# Patient Record
Sex: FEMALE | Race: WHITE | NOT HISPANIC OR LATINO | Employment: OTHER | URBAN - METROPOLITAN AREA
[De-identification: names, ages, dates, MRNs, and addresses within clinical notes are randomized per-mention and may not be internally consistent; named-entity substitution may affect disease eponyms.]

---

## 2017-02-10 ENCOUNTER — TELEPHONE (OUTPATIENT)
Dept: FAMILY MEDICINE | Facility: CLINIC | Age: 62
End: 2017-02-10

## 2017-02-21 ENCOUNTER — RADIANT APPOINTMENT (OUTPATIENT)
Dept: MAMMOGRAPHY | Facility: CLINIC | Age: 62
End: 2017-02-21
Attending: FAMILY MEDICINE
Payer: COMMERCIAL

## 2017-02-21 DIAGNOSIS — Z12.31 ENCOUNTER FOR MAMMOGRAM TO ESTABLISH BASELINE MAMMOGRAM: ICD-10-CM

## 2017-02-21 PROCEDURE — 77063 BREAST TOMOSYNTHESIS BI: CPT

## 2017-02-21 PROCEDURE — G0202 SCR MAMMO BI INCL CAD: HCPCS

## 2017-07-17 ENCOUNTER — OFFICE VISIT (OUTPATIENT)
Dept: FAMILY MEDICINE | Facility: CLINIC | Age: 62
End: 2017-07-17
Payer: COMMERCIAL

## 2017-07-17 VITALS
BODY MASS INDEX: 18.25 KG/M2 | HEIGHT: 63 IN | DIASTOLIC BLOOD PRESSURE: 66 MMHG | RESPIRATION RATE: 16 BRPM | WEIGHT: 103 LBS | HEART RATE: 47 BPM | TEMPERATURE: 97.6 F | SYSTOLIC BLOOD PRESSURE: 108 MMHG | OXYGEN SATURATION: 100 %

## 2017-07-17 DIAGNOSIS — Z00.00 ENCOUNTER FOR ROUTINE ADULT HEALTH EXAMINATION WITHOUT ABNORMAL FINDINGS: Primary | ICD-10-CM

## 2017-07-17 DIAGNOSIS — D64.9 ANEMIA, UNSPECIFIED TYPE: ICD-10-CM

## 2017-07-17 DIAGNOSIS — Z78.0 MENOPAUSE: ICD-10-CM

## 2017-07-17 DIAGNOSIS — M85.80 OSTEOPENIA, UNSPECIFIED LOCATION: ICD-10-CM

## 2017-07-17 DIAGNOSIS — D51.3 OTHER DIETARY VITAMIN B12 DEFICIENCY ANEMIA: ICD-10-CM

## 2017-07-17 LAB
BASOPHILS # BLD AUTO: 0.1 10E9/L (ref 0–0.2)
BASOPHILS NFR BLD AUTO: 1.9 %
DIFFERENTIAL METHOD BLD: ABNORMAL
EOSINOPHIL # BLD AUTO: 0.1 10E9/L (ref 0–0.7)
EOSINOPHIL NFR BLD AUTO: 3.2 %
ERYTHROCYTE [DISTWIDTH] IN BLOOD BY AUTOMATED COUNT: 13.3 % (ref 10–15)
FERRITIN SERPL-MCNC: 24 NG/ML (ref 8–252)
HCT VFR BLD AUTO: 43 % (ref 35–47)
HGB BLD-MCNC: 14.2 G/DL (ref 11.7–15.7)
LYMPHOCYTES # BLD AUTO: 1.4 10E9/L (ref 0.8–5.3)
LYMPHOCYTES NFR BLD AUTO: 36 %
MCH RBC QN AUTO: 30.3 PG (ref 26.5–33)
MCHC RBC AUTO-ENTMCNC: 33 G/DL (ref 31.5–36.5)
MCV RBC AUTO: 92 FL (ref 78–100)
MONOCYTES # BLD AUTO: 0.4 10E9/L (ref 0–1.3)
MONOCYTES NFR BLD AUTO: 9.3 %
NEUTROPHILS # BLD AUTO: 1.9 10E9/L (ref 1.6–8.3)
NEUTROPHILS NFR BLD AUTO: 49.6 %
PLATELET # BLD AUTO: 209 10E9/L (ref 150–450)
RBC # BLD AUTO: 4.69 10E12/L (ref 3.8–5.2)
VIT B12 SERPL-MCNC: 487 PG/ML (ref 193–986)
WBC # BLD AUTO: 3.8 10E9/L (ref 4–11)

## 2017-07-17 PROCEDURE — 99396 PREV VISIT EST AGE 40-64: CPT | Performed by: FAMILY MEDICINE

## 2017-07-17 PROCEDURE — 82607 VITAMIN B-12: CPT | Performed by: FAMILY MEDICINE

## 2017-07-17 PROCEDURE — 36415 COLL VENOUS BLD VENIPUNCTURE: CPT | Performed by: FAMILY MEDICINE

## 2017-07-17 PROCEDURE — 85025 COMPLETE CBC W/AUTO DIFF WBC: CPT | Performed by: FAMILY MEDICINE

## 2017-07-17 PROCEDURE — 82728 ASSAY OF FERRITIN: CPT | Performed by: FAMILY MEDICINE

## 2017-07-17 ASSESSMENT — PAIN SCALES - GENERAL: PAINLEVEL: NO PAIN (0)

## 2017-07-17 NOTE — PROGRESS NOTES
SUBJECTIVE:   CC: Rosa Craven is an 61 year old woman who presents for preventive health visit.     Healthy Habits:    Do you get at least three servings of calcium containing foods daily (dairy, green leafy vegetables, etc.)? yes    Amount of exercise or daily activities, outside of work: 7 day(s) per week    Problems taking medications regularly No    Medication side effects: No    Have you had an eye exam in the past two years? yes    Do you see a dentist twice per year? yes    Do you have sleep apnea, excessive snoring or daytime drowsiness?no    Health Maintenance    - Pap Smear in 2015, normal, complete in 2018.    -Mammogram in February 2017, good.    -Colonoscopy in 2011, 1 polyp removed, benign    -Check Cholesterol levels and glucose levels. Patient is fasting today.    -Screening Hep-C completed previously.   -Tetanus vaccine completed previously.  -Shingles vaccine completed previously.    Nutrition  - Does not like to drink milk. She tries to get calcium from multivitamin.     Anemia  - Had been told previously that she had anemia, however it is not an issue right now.    Today's PHQ-2 Score:   PHQ-2 ( 1999 Pfizer) 7/17/2017 11/12/2015   Q1: Little interest or pleasure in doing things 0 0   Q2: Feeling down, depressed or hopeless 0 0   PHQ-2 Score 0 0   Q1: Little interest or pleasure in doing things - -   Q2: Feeling down, depressed or hopeless - -   PHQ-2 Score - -       Abuse: Current or Past(Physical, Sexual or Emotional)- No  Do you feel safe in your environment - Yes    Social History   Substance Use Topics     Smoking status: Never Smoker     Smokeless tobacco: Never Used     Alcohol use No     The patient does not drink >3 drinks per day nor >7 drinks per week.    Reviewed orders with patient.  Reviewed health maintenance and updated orders accordingly - Yes  Patient Active Problem List   Diagnosis     CARDIOVASCULAR SCREENING; LDL GOAL LESS THAN 160     Anemia     B12 deficiency anemia  "    Advanced directives, counseling/discussion     Osteopenia     Past Surgical History:   Procedure Laterality Date     COLONOSCOPY       ENDOSCOPY UPPER, COLONOSCOPY, COMBINED  12/2/2011    Procedure:COMBINED ENDOSCOPY UPPER, COLONOSCOPY; Colonoscopy with Snare polypectomy, Esophagogastroduodenoscopy with duodenal and gastric biopsy; Surgeon:MONTY WOOD; Location: OR      CAPSULE ENDOSCOPY  12/15/2011    Procedure:CAPSULE/PILL CAM ENDOSCOPY; Surgeon:NEEMA INMAN; Location: GI     wisdom teeth  as teen       Social History   Substance Use Topics     Smoking status: Never Smoker     Smokeless tobacco: Never Used     Alcohol use No     Family History   Problem Relation Age of Onset     CANCER Mother 64     cervical CA     C.A.D. Father 46     MI (perhaps it was a PE as thought came from DVT)- smoked, overweight           Patient over age 50, mutual decision to screen reflected in health maintenance.    Pertinent mammograms are reviewed under the imaging tab.  History of abnormal Pap smear: NO - age 30- 65 PAP every 3 years recommended    Reviewed and updated as needed this visit by clinical staff  Tobacco  Allergies  Meds  Med Hx  Surg Hx  Fam Hx  Soc Hx        Reviewed and updated as needed this visit by Provider            ROS:   ROS: 10 point ROS neg other than the symptoms noted above in the HPI.    This document serves as a record of the services and decisions personally performed and made by Emma Guerra MD. It was created on her behalf by Isabel Mariano, a trained medical scribe. The creation of this document is based on the provider's statements to the medical scribe.  Isabel Mariano 2:08 PM July 17, 2017      OBJECTIVE:   /66 (BP Location: Right arm, Patient Position: Chair, Cuff Size: Adult Regular)  Pulse (!) 47  Temp 97.6  F (36.4  C) (Oral)  Resp 16  Ht 1.588 m (5' 2.5\")  Wt 46.7 kg (103 lb)  SpO2 100%  Breastfeeding? No  BMI 18.54 kg/m2  EXAM:  GENERAL " APPEARANCE: healthy, alert and no distress  EYES: Eyes grossly normal to inspection, PERRL and conjunctivae and sclerae normal  HENT: ear canals and TM's normal, nose and mouth without ulcers or lesions, oropharynx clear and oral mucous membranes moist  NECK: no adenopathy, no asymmetry, masses, or scars and thyroid normal to palpation  RESP: lungs clear to auscultation - no rales, rhonchi or wheezes  BREAST: normal without masses, tenderness or nipple discharge and no palpable axillary masses or adenopathy  CV: regular rate and rhythm, normal S1 S2, no S3 or S4, no murmur, click or rub, no peripheral edema and peripheral pulses strong  ABDOMEN: soft, nontender, no hepatosplenomegaly, no masses and bowel sounds normal   (female): normal female external genitalia, normal urethral meatus, vaginal mucosal atrophy noted, normal cervix, adnexae, and uterus without masses or abnormal discharge  MS: no musculoskeletal defects are noted and gait is age appropriate without ataxia  SKIN: no suspicious lesions or rashes  NEURO: Normal strength and tone, sensory exam grossly normal, mentation intact and speech normal  PSYCH: mentation appears normal and affect normal/bright    ASSESSMENT/PLAN:   1. Encounter for routine adult health examination without abnormal findings    2. Osteopenia, unspecified location  5. Menopause  Discussed adequate calcium intake, vit D, exercise  - DX Hip/Pelvis/Spine; Future    3. Other dietary vitamin B12 deficiency anemia  - CBC with platelets differential  - Vitamin B12    4. Anemia, unspecified type  Last testing had been stable. Patient would like to confirm staying stable.   - CBC with platelets differential  - Ferritin  - Vitamin B12          COUNSELING:   Reviewed preventive health counseling, as reflected in patient instructions       Regular exercise       Healthy diet/nutrition       Vision screening       Hearing screening       Sunscreen         reports that she has never smoked. She  "has never used smokeless tobacco.    Estimated body mass index is 18.54 kg/(m^2) as calculated from the following:    Height as of this encounter: 1.588 m (5' 2.5\").    Weight as of this encounter: 46.7 kg (103 lb).   Weight management plan noted, stable and monitoring and .    Counseling Resources:  ATP IV Guidelines  Pooled Cohorts Equation Calculator  Breast Cancer Risk Calculator  FRAX Risk Assessment  ICSI Preventive Guidelines  Dietary Guidelines for Americans, 2010  USDA's MyPlate  ASA Prophylaxis  Lung CA Screening      The information in this document, created by the medical scribe for me, accurately reflects the services I personally performed and the decisions made by me. I have reviewed and approved this document for accuracy prior to leaving the patient care area.  July 17, 2017 2:09 PM    Emma Guerra MD  [facility]  "

## 2017-07-17 NOTE — PATIENT INSTRUCTIONS
Preventive Health Recommendations  Female Ages 50 - 64    Please call [facility] (formerly called [facility]) at [phone] to schedule Bone scan.    Yearly exam: See your health care provider every year in order to  o Review health changes.   o Discuss preventive care.    o Review your medicines if your doctor has prescribed any.      Get a Pap test every three years (unless you have an abnormal result and your provider advises testing more often).    If you get Pap tests with HPV test, you only need to test every 5 years, unless you have an abnormal result.     You do not need a Pap test if your uterus was removed (hysterectomy) and you have not had cancer.    You should be tested each year for STDs (sexually transmitted diseases) if you're at risk.     Have a mammogram every 1 to 2 years.    Have a colonoscopy at age 50, or have a yearly FIT test (stool test). These exams screen for colon cancer.      Have a cholesterol test every 5 years, or more often if advised.    Have a diabetes test (fasting glucose) every three years. If you are at risk for diabetes, you should have this test more often.     If you are at risk for osteoporosis (brittle bone disease), think about having a bone density scan (DEXA).    Shots: Get a flu shot each year. Get a tetanus shot every 10 years.    Nutrition:     Eat at least 5 servings of fruits and vegetables each day.    Eat whole-grain bread, whole-wheat pasta and brown rice instead of white grains and rice.    Talk to your provider about 3 servings of Calcium Magnesium, and Vitamin D 1000mg.     Lifestyle    Exercise at least 150 minutes a week (30 minutes a day, 5 days a week). Recommend to include weight bearing exercises. This will help you control your weight and prevent disease.    Limit alcohol to one drink per day.    No smoking.     Wear sunscreen to prevent skin cancer.     See your dentist every six months for an exam  and cleaning.    See your eye doctor every 1 to 2 years.

## 2017-07-17 NOTE — MR AVS SNAPSHOT
After Visit Summary   7/17/2017    Rosa Craven    MRN: [id number]           Patient Information     Date Of Birth          1955        Visit Information        Provider Department      7/17/2017 9:20 AM Emma Guerra MD [facility]        Today's Diagnoses     Encounter for routine adult health examination without abnormal findings    -  1    Osteopenia, unspecified location        Other dietary vitamin B12 deficiency anemia        Anemia, unspecified type        Menopause          Care Instructions      Preventive Health Recommendations  Female Ages 50 - 64    Yearly exam: See your health care provider every year in order to  o Review health changes.   o Discuss preventive care.    o Review your medicines if your doctor has prescribed any.      Get a Pap test every three years (unless you have an abnormal result and your provider advises testing more often).    If you get Pap tests with HPV test, you only need to test every 5 years, unless you have an abnormal result.     You do not need a Pap test if your uterus was removed (hysterectomy) and you have not had cancer.    You should be tested each year for STDs (sexually transmitted diseases) if you're at risk.     Have a mammogram every 1 to 2 years.    Have a colonoscopy at age 50, or have a yearly FIT test (stool test). These exams screen for colon cancer.      Have a cholesterol test every 5 years, or more often if advised.    Have a diabetes test (fasting glucose) every three years. If you are at risk for diabetes, you should have this test more often.     If you are at risk for osteoporosis (brittle bone disease), think about having a bone density scan (DEXA).    Shots: Get a flu shot each year. Get a tetanus shot every 10 years.    Nutrition:     Eat at least 5 servings of fruits and vegetables each day.    Eat whole-grain bread, whole-wheat pasta and brown rice instead of white grains and rice.    Talk to  your provider about 3 servings of Calcium Magnesium, and Vitamin D 1000mg.     Lifestyle    Exercise at least 150 minutes a week (30 minutes a day, 5 days a week). Recommend to include weight bearing exercises. This will help you control your weight and prevent disease.    Limit alcohol to one drink per day.    No smoking.     Wear sunscreen to prevent skin cancer.     See your dentist every six months for an exam and cleaning.    See your eye doctor every 1 to 2 years.      Please call [facility] (formerly called [facility]) at [phone] to schedule Bone scan.            Follow-ups after your visit        Future tests that were ordered for you today     Open Future Orders        Priority Expected Expires Ordered    DX Hip/Pelvis/Spine Routine  7/17/2018 7/17/2017            Who to contact     If you have questions or need follow up information about today's clinic visit or your schedule please contact [facility] directly at [phone].  Normal or non-critical lab and imaging results will be communicated to you by Noster Mobilehart, letter or phone within 4 business days after the clinic has received the results. If you do not hear from us within 7 days, please contact the clinic through Spayee or phone. If you have a critical or abnormal lab result, we will notify you by phone as soon as possible.  Submit refill requests through Spayee or call your pharmacy and they will forward the refill request to us. Please allow 3 business days for your refill to be completed.          Additional Information About Your Visit        Noster MobileharVisiprise Information     Spayee gives you secure access to your electronic health record. If you see a primary care provider, you can also send messages to your care team and make appointments. If you have questions, please call your primary care clinic.  If you do not have a primary care provider, please call [phone] and they will  "assist you.        Care EveryWhere ID     This is your Care EveryWhere ID. This could be used by other organizations to access your [place] medical records  [id number]        Your Vitals Were     Pulse Temperature Respirations Height Pulse Oximetry Breastfeeding?    47 97.6  F (36.4  C) (Oral) 16 1.588 m (5' 2.5\") 100% No    BMI (Body Mass Index)                   18.54 kg/m2            Blood Pressure from Last 3 Encounters:   07/17/17 108/66   04/13/16 130/82   11/12/15 110/70    Weight from Last 3 Encounters:   07/17/17 46.7 kg (103 lb)   04/13/16 48.1 kg (106 lb)   11/12/15 47.4 kg (104 lb 9.6 oz)              We Performed the Following     CBC with platelets differential     Ferritin     Vitamin B12        Primary Care Provider Office Phone # Fax #    Emma Guerra [phone]       [facility] [address] [place] [address] [address]  [facility] [address]        Equal Access to Services     YULIANA MURRAY : Hadii aad ku hadasho Soomaali, waaxda luqadaha, qaybta kaalmada adeegyada, waxay idiin haygino paulino . So [facility] [phone].    ATENCIÓN: Si habla español, tiene a kemp disposición servicios gratuitos de asistencia lingüística. Llame al [phone].    We comply with applicable federal civil rights laws and [place] laws. We do not discriminate on the basis of race, color, national origin, age, disability sex, sexual orientation or gender identity.            Thank you!     Thank you for choosing [facility]  for your care. Our goal is always to provide you with excellent care. Hearing back from our patients is one way we can continue to improve our services. Please take a few minutes to complete the written survey that you may receive in the mail after your visit with us. Thank you!             Your Updated Medication List - Protect others around you: Learn how to safely use, store and throw away your medicines at www.disposemymeds.org.          This list is " accurate as of: 7/17/17  9:59 AM.  Always use your most recent med list.                   Brand Name Dispense Instructions for use Diagnosis    MULTIVITAMIN PO      Take 1 tablet by mouth daily.

## 2017-07-17 NOTE — NURSING NOTE
"Chief Complaint   Patient presents with     Physical       Initial /66 (BP Location: Right arm, Patient Position: Chair, Cuff Size: Adult Regular)  Pulse (!) 47  Temp 97.6  F (36.4  C) (Oral)  Resp 16  Ht 1.588 m (5' 2.5\")  Wt 46.7 kg (103 lb)  SpO2 100%  Breastfeeding? No  BMI 18.54 kg/m2 Estimated body mass index is 18.54 kg/(m^2) as calculated from the following:    Height as of this encounter: 1.588 m (5' 2.5\").    Weight as of this encounter: 46.7 kg (103 lb).  Medication Reconciliation: complete     Monique Carlson MA       "

## 2017-12-20 ENCOUNTER — RADIANT APPOINTMENT (OUTPATIENT)
Dept: BONE DENSITY | Facility: CLINIC | Age: 62
End: 2017-12-20
Attending: FAMILY MEDICINE
Payer: COMMERCIAL

## 2017-12-20 PROCEDURE — 77080 DXA BONE DENSITY AXIAL: CPT | Performed by: RADIOLOGY

## 2018-02-07 ENCOUNTER — ALLIED HEALTH/NURSE VISIT (OUTPATIENT)
Dept: NURSING | Facility: CLINIC | Age: 63
End: 2018-02-07
Payer: COMMERCIAL

## 2018-02-07 DIAGNOSIS — Z23 NEED FOR PROPHYLACTIC VACCINATION AND INOCULATION AGAINST INFLUENZA: Primary | ICD-10-CM

## 2018-02-07 PROCEDURE — 90471 IMMUNIZATION ADMIN: CPT

## 2018-02-07 PROCEDURE — 99207 ZZC NO CHARGE NURSE ONLY: CPT

## 2018-02-07 PROCEDURE — 90686 IIV4 VACC NO PRSV 0.5 ML IM: CPT

## 2018-02-07 NOTE — MR AVS SNAPSHOT
After Visit Summary   2/7/2018    Rosa Craven    MRN: [id number]           Patient Information     Date Of Birth          1955        Visit Information        Provider Department      2/7/2018 1:20 PM BA [facility]LL[facility]        Today's Diagnoses     Need for prophylactic vaccination and inoculation against influenza    -  1       Follow-ups after your visit        Who to contact     If you have questions or need follow up information about today's clinic visit or your schedule please contact [facility] directly at [phone].  Normal or non-critical lab and imaging results will be communicated to you by Athlete Builderhart, letter or phone within 4 business days after the clinic has received the results. If you do not hear from us within 7 days, please contact the clinic through EnvironmentIQt or phone. If you have a critical or abnormal lab result, we will notify you by phone as soon as possible.  Submit refill requests through CrowdFeed or call your pharmacy and they will forward the refill request to us. Please allow 3 business days for your refill to be completed.          Additional Information About Your Visit        MyChart Information     CrowdFeed gives you secure access to your electronic health record. If you see a primary care provider, you can also send messages to your care team and make appointments. If you have questions, please call your primary care clinic.  If you do not have a primary care provider, please call [phone] and they will assist you.        Care EveryWhere ID     This is your Care EveryWhere ID. This could be used by other organizations to access your [place] medical records  [id number]         Blood Pressure from Last 3 Encounters:   07/17/17 108/66   04/13/16 130/82   11/12/15 110/70    Weight from Last 3 Encounters:   07/17/17 46.7 kg (103 lb)   04/13/16 48.1 kg (106 lb)   11/12/15 47.4 kg (104 lb 9.6 oz)              We Performed the  Following     FLU VAC, SPLIT VIRUS IM > 3 YO (QUADRIVALENT) [44435]     Vaccine Administration, Initial [43825]        Primary Care Provider Office Phone # Fax #    Emma Guerra [phone] [address]  [facility] [address]        Equal Access to Services     [facility] Hospital[facility] : Hadii aad ku hadasho Soomaali, waaxda luqadaha, qaybta kaalmada adeegyada, waxay idiin hayaan adeeg kharash laAidenaan . So [facility] [phone].    ATENCIÓN: Si habla español, tiene a kemp disposición servicios gratuitos de asistencia lingüística. Llame al [phone].    We comply with applicable federal civil rights laws and [place] laws. We do not discriminate on the basis of race, color, national origin, age, disability, sex, sexual orientation, or gender identity.            Thank you!     Thank you for choosing [facility]  for your care. Our goal is always to provide you with excellent care. Hearing back from our patients is one way we can continue to improve our services. Please take a few minutes to complete the written survey that you may receive in the mail after your visit with us. Thank you!             Your Updated Medication List - Protect others around you: Learn how to safely use, store and throw away your medicines at www.disposemymeds.org.          This list is accurate as of 2/7/18  1:25 PM.  Always use your most recent med list.                   Brand Name Dispense Instructions for use Diagnosis    MULTIVITAMIN PO      Take 1 tablet by mouth daily.

## 2018-02-07 NOTE — PROGRESS NOTES

## 2018-05-21 ENCOUNTER — RADIANT APPOINTMENT (OUTPATIENT)
Dept: MAMMOGRAPHY | Facility: CLINIC | Age: 63
End: 2018-05-21
Attending: FAMILY MEDICINE
Payer: COMMERCIAL

## 2018-05-21 DIAGNOSIS — Z12.31 VISIT FOR SCREENING MAMMOGRAM: ICD-10-CM

## 2018-05-21 PROCEDURE — 77063 BREAST TOMOSYNTHESIS BI: CPT | Performed by: RADIOLOGY

## 2018-05-21 PROCEDURE — 77067 SCR MAMMO BI INCL CAD: CPT | Performed by: RADIOLOGY

## 2019-02-15 ENCOUNTER — HEALTH MAINTENANCE LETTER (OUTPATIENT)
Age: 64
End: 2019-02-15

## 2019-03-18 ENCOUNTER — OFFICE VISIT (OUTPATIENT)
Dept: FAMILY MEDICINE | Facility: CLINIC | Age: 64
End: 2019-03-18
Payer: COMMERCIAL

## 2019-03-18 VITALS
OXYGEN SATURATION: 99 % | HEART RATE: 54 BPM | RESPIRATION RATE: 16 BRPM | SYSTOLIC BLOOD PRESSURE: 108 MMHG | DIASTOLIC BLOOD PRESSURE: 75 MMHG | BODY MASS INDEX: 18.07 KG/M2 | TEMPERATURE: 98.2 F | WEIGHT: 102 LBS | HEIGHT: 63 IN

## 2019-03-18 DIAGNOSIS — Z12.83 SCREENING FOR SKIN CANCER: ICD-10-CM

## 2019-03-18 DIAGNOSIS — D51.9 ANEMIA DUE TO VITAMIN B12 DEFICIENCY, UNSPECIFIED B12 DEFICIENCY TYPE: ICD-10-CM

## 2019-03-18 DIAGNOSIS — Z11.4 SCREENING FOR HIV (HUMAN IMMUNODEFICIENCY VIRUS): ICD-10-CM

## 2019-03-18 DIAGNOSIS — Z23 NEED FOR SHINGLES VACCINE: ICD-10-CM

## 2019-03-18 DIAGNOSIS — Z23 NEED FOR PROPHYLACTIC VACCINATION AND INOCULATION AGAINST INFLUENZA: ICD-10-CM

## 2019-03-18 DIAGNOSIS — D64.9 ANEMIA, UNSPECIFIED TYPE: ICD-10-CM

## 2019-03-18 DIAGNOSIS — Z12.4 SCREENING FOR MALIGNANT NEOPLASM OF CERVIX: ICD-10-CM

## 2019-03-18 DIAGNOSIS — M81.0 OSTEOPOROSIS, UNSPECIFIED OSTEOPOROSIS TYPE, UNSPECIFIED PATHOLOGICAL FRACTURE PRESENCE: ICD-10-CM

## 2019-03-18 DIAGNOSIS — Z00.00 ENCOUNTER FOR ROUTINE ADULT HEALTH EXAMINATION WITHOUT ABNORMAL FINDINGS: Primary | ICD-10-CM

## 2019-03-18 LAB
BASOPHILS # BLD AUTO: 0 10E9/L (ref 0–0.2)
BASOPHILS NFR BLD AUTO: 0.7 %
CHOLEST SERPL-MCNC: 253 MG/DL
DIFFERENTIAL METHOD BLD: NORMAL
EOSINOPHIL # BLD AUTO: 0.1 10E9/L (ref 0–0.7)
EOSINOPHIL NFR BLD AUTO: 2.4 %
ERYTHROCYTE [DISTWIDTH] IN BLOOD BY AUTOMATED COUNT: 13.1 % (ref 10–15)
FERRITIN SERPL-MCNC: 36 NG/ML (ref 8–252)
GLUCOSE SERPL-MCNC: 99 MG/DL (ref 70–99)
HCT VFR BLD AUTO: 42.3 % (ref 35–47)
HDLC SERPL-MCNC: 92 MG/DL
HGB BLD-MCNC: 13.8 G/DL (ref 11.7–15.7)
LDLC SERPL CALC-MCNC: 138 MG/DL
LYMPHOCYTES # BLD AUTO: 1.5 10E9/L (ref 0.8–5.3)
LYMPHOCYTES NFR BLD AUTO: 36.1 %
MCH RBC QN AUTO: 30.5 PG (ref 26.5–33)
MCHC RBC AUTO-ENTMCNC: 32.6 G/DL (ref 31.5–36.5)
MCV RBC AUTO: 93 FL (ref 78–100)
MONOCYTES # BLD AUTO: 0.4 10E9/L (ref 0–1.3)
MONOCYTES NFR BLD AUTO: 9 %
NEUTROPHILS # BLD AUTO: 2.2 10E9/L (ref 1.6–8.3)
NEUTROPHILS NFR BLD AUTO: 51.8 %
NONHDLC SERPL-MCNC: 161 MG/DL
PLATELET # BLD AUTO: 203 10E9/L (ref 150–450)
RBC # BLD AUTO: 4.53 10E12/L (ref 3.8–5.2)
TRIGL SERPL-MCNC: 115 MG/DL
VIT B12 SERPL-MCNC: 496 PG/ML (ref 193–986)
WBC # BLD AUTO: 4.2 10E9/L (ref 4–11)

## 2019-03-18 PROCEDURE — 99396 PREV VISIT EST AGE 40-64: CPT | Mod: 25 | Performed by: FAMILY MEDICINE

## 2019-03-18 PROCEDURE — G0123 SCREEN CERV/VAG THIN LAYER: HCPCS | Performed by: FAMILY MEDICINE

## 2019-03-18 PROCEDURE — 82947 ASSAY GLUCOSE BLOOD QUANT: CPT | Performed by: FAMILY MEDICINE

## 2019-03-18 PROCEDURE — 90472 IMMUNIZATION ADMIN EACH ADD: CPT | Performed by: FAMILY MEDICINE

## 2019-03-18 PROCEDURE — 87624 HPV HI-RISK TYP POOLED RSLT: CPT | Performed by: FAMILY MEDICINE

## 2019-03-18 PROCEDURE — 85025 COMPLETE CBC W/AUTO DIFF WBC: CPT | Performed by: FAMILY MEDICINE

## 2019-03-18 PROCEDURE — 82607 VITAMIN B-12: CPT | Performed by: FAMILY MEDICINE

## 2019-03-18 PROCEDURE — 82728 ASSAY OF FERRITIN: CPT | Performed by: FAMILY MEDICINE

## 2019-03-18 PROCEDURE — 90471 IMMUNIZATION ADMIN: CPT | Performed by: FAMILY MEDICINE

## 2019-03-18 PROCEDURE — 80061 LIPID PANEL: CPT | Performed by: FAMILY MEDICINE

## 2019-03-18 PROCEDURE — 87389 HIV-1 AG W/HIV-1&-2 AB AG IA: CPT | Performed by: FAMILY MEDICINE

## 2019-03-18 PROCEDURE — 90686 IIV4 VACC NO PRSV 0.5 ML IM: CPT | Performed by: FAMILY MEDICINE

## 2019-03-18 PROCEDURE — 99212 OFFICE O/P EST SF 10 MIN: CPT | Mod: 25 | Performed by: FAMILY MEDICINE

## 2019-03-18 PROCEDURE — 90750 HZV VACC RECOMBINANT IM: CPT | Performed by: FAMILY MEDICINE

## 2019-03-18 PROCEDURE — 36415 COLL VENOUS BLD VENIPUNCTURE: CPT | Performed by: FAMILY MEDICINE

## 2019-03-18 RX ORDER — ALENDRONATE SODIUM 70 MG/1
70 TABLET ORAL
Qty: 12 TABLET | Refills: 3 | Status: SHIPPED | OUTPATIENT
Start: 2019-03-18 | End: 2020-02-14

## 2019-03-18 ASSESSMENT — MIFFLIN-ST. JEOR: SCORE: 988.86

## 2019-03-18 ASSESSMENT — PAIN SCALES - GENERAL: PAINLEVEL: NO PAIN (0)

## 2019-03-18 NOTE — PROGRESS NOTES
SUBJECTIVE:   CC: Rosa Craven is an 63 year old woman who presents for preventive health visit.     Healthy Habits:    Do you get at least three servings of calcium containing foods daily (dairy, green leafy vegetables, etc.)? yes    Amount of exercise or daily activities, outside of work: 7 day(s) per week    Problems taking medications regularly No    Medication side effects: No    Have you had an eye exam in the past two years? yes    Do you see a dentist twice per year? yes    Do you have sleep apnea, excessive snoring or daytime drowsiness?no    Mood:  In the last year patient retired and moved to a cabin up [place], but keeps a townhouse in this area so still spends time here. She is still adjusting to custodial, but the last year was so busy with moving and building she does not feel she has really been able to enjoy being retired. She is not used to spending so much time with her  which has been an adjustment but they are working on their relationship and her overall mood has been good.     Additional:  -Taking a multi-vitamin regularly. Patient was considering starting a calcium supplement but did not do this because she wasn't sure what to start. She eats two servings of dairy daily   -Attributes her minor weight loss to stress from the move and missing meals due to being so busy  -Occasionally gets heartburn in the morning when eating toast and drinking coffee    Wt Readings from Last 4 Encounters:   03/18/19 46.3 kg (102 lb)   07/17/17 46.7 kg (103 lb)   04/13/16 48.1 kg (106 lb)   11/12/15 47.4 kg (104 lb 9.6 oz)       Today's PHQ-2 Score:   PHQ-2 ( 1999 Pfizer) 3/18/2019 7/17/2017   Q1: Little interest or pleasure in doing things 0 0   Q2: Feeling down, depressed or hopeless 0 0   PHQ-2 Score 0 0   Q1: Little interest or pleasure in doing things - -   Q2: Feeling down, depressed or hopeless - -   PHQ-2 Score - -       Abuse: Current or Past(Physical, Sexual or Emotional)- No  Do you feel  safe in your environment? Yes    Social History     Tobacco Use     Smoking status: Never Smoker     Smokeless tobacco: Never Used   Substance Use Topics     Alcohol use: No     If you drink alcohol do you typically have >3 drinks per day or >7 drinks per week? No                     Reviewed orders with patient.  Reviewed health maintenance and updated orders accordingly - Yes  Patient Active Problem List   Diagnosis     CARDIOVASCULAR SCREENING; LDL GOAL LESS THAN 160     Anemia     B12 deficiency anemia     Advanced directives, counseling/discussion     Osteopenia     Past Surgical History:   Procedure Laterality Date     COLONOSCOPY       ENDOSCOPY UPPER, COLONOSCOPY, COMBINED  12/2/2011    Procedure:COMBINED ENDOSCOPY UPPER, COLONOSCOPY; Colonoscopy with Snare polypectomy, Esophagogastroduodenoscopy with duodenal and gastric biopsy; Surgeon:MONTY WOOD; Location: OR      CAPSULE ENDOSCOPY  12/15/2011    Procedure:CAPSULE/PILL CAM ENDOSCOPY; Surgeon:NEEMA INMAN; Location: GI     wisdom teeth  as teen       Social History     Tobacco Use     Smoking status: Never Smoker     Smokeless tobacco: Never Used   Substance Use Topics     Alcohol use: No     Family History   Problem Relation Age of Onset     Cancer Mother 64        cervical CA     C.A.D. Father 46        MI (perhaps it was a PE as thought came from DVT)- smoked, overweight           Mammogram Screening: Patient over age 50, mutual decision to screen reflected in health maintenance.    Pertinent mammograms are reviewed under the imaging tab.  History of abnormal Pap smear: NO - age 30-65 PAP every 5 years with negative HPV co-testing recommended  PAP / HPV 11/12/2015 6/18/2013 2/16/2011   PAP NIL NIL NIL     Reviewed and updated as needed this visit by clinical staff  Tobacco  Allergies  Meds  Med Hx  Surg Hx  Fam Hx  Soc Hx        Reviewed and updated as needed this visit by Provider            ROS:   ROS: 10 point ROS neg other  "than the symptoms noted above in the HPI.    This document serves as a record of the services and decisions personally performed by LOIS ALLEN. It was created on his/her behalf by Jaycee Arroyo, a trained medical scribe. The creation of this document is based on the provider's statements to the medical scribe. Jaycee Arroyo, March 18, 2019 10:18 AM  OBJECTIVE:   /75 (BP Location: Right arm, Patient Position: Chair, Cuff Size: Adult Regular)   Pulse 54   Temp 98.2  F (36.8  C) (Oral)   Resp 16   Ht 1.588 m (5' 2.5\")   Wt 46.3 kg (102 lb)   LMP  (Exact Date)   SpO2 99%   Breastfeeding? No   BMI 18.36 kg/m    EXAM:  GENERAL APPEARANCE: healthy, alert and no distress  EYES: Eyes grossly normal to inspection, PERRL and conjunctivae and sclerae normal  HENT: ear canals and TM's normal, nose and mouth without ulcers or lesions, oropharynx clear and oral mucous membranes moist  NECK: no adenopathy, no asymmetry, masses, or scars and thyroid normal to palpation  RESP: lungs clear to auscultation - no rales, rhonchi or wheezes  BREAST: normal without masses, tenderness or nipple discharge and no palpable axillary masses or adenopathy  CV: regular rate and rhythm, normal S1 S2, no S3 or S4, no murmur, click or rub, no peripheral edema and peripheral pulses strong  ABDOMEN: soft, nontender, no hepatosplenomegaly, no masses and bowel sounds normal   (female): normal female external genitalia, normal urethral meatus, vaginal mucosal atrophy noted, normal cervix, adnexae, and uterus without masses or abnormal discharge  MS: no musculoskeletal defects are noted and gait is age appropriate without ataxia  SKIN: scattered nevi across back. no suspicious lesions or rashes  NEURO: Normal strength and tone, sensory exam grossly normal, mentation intact and speech normal  PSYCH: mentation appears normal and affect normal/bright    12/19/17 Dx Hip/Pelvis/Spine:   HISTORY: ; Osteopenia, unspecified " location; Menopause     COMPARISON:   12/10/2015     Age: 62  years.  Height: 62 inches  Weight: 103 pounds  Sex: Female  Ethnicity: White     Image quality: Adequate     Lumbar spine T-score in region of L1-L4 = -2.3   L1-4 percent change: -2.7%      HIPS:  Mean total hip T-score: -1.9  Mean total hip percent change: -2.7%      Left femoral neck T-score = -2.5  Right femoral neck T-score= -1.8      COMPARISON TO PRIOR:  Percent change in the spine and hips is NOT significant (or considered  invalid) accounting for the precision errors for this facility.      FRAX:  10 year probability of major osteoporotic fracture: 10.0%  10 year probability of hip fracture: 2.1%  The 10 year probability of fracture may be lower than reported if the  patient has received treatment. FRAX data should be disregarded in  patient's taking bisphosphonates.     World Health Organization definition of osteoporosis and osteopenia  for  women:   Normal: T-score at or above -1.0  Low Bone Mass (Osteopenia): T-score between -1.0 and -2.5.   Osteoporosis: T-score at or below -2.5   T-scores are reported for postmenopausal women and men over 50 years  of age.                                                                      IMPRESSION:  Osteoporosis     GLENN DUDLEY MD  ASSESSMENT/PLAN:   1. Encounter for routine adult health examination without abnormal findings  - GLUCOSE  - Lipid panel reflex to direct LDL Fasting    2. Screening for malignant neoplasm of cervix  - Pap imaged thin layer screen with HPV - recommended age 30 - 65 years (select HPV order below)  - HPV High Risk Types DNA Cervical    3. Screening for HIV (human immunodeficiency virus)  - HIV Screening    4. Screening for skin cancer  - DERMATOLOGY REFERRAL    5. Need for prophylactic vaccination and inoculation against influenza  - FLU VACCINE, SPLIT VIRUS, IM (QUADRIVALENT) [18667]- >3 YRS  - Vaccine Administration, Initial [84950]    6. Need for shingles  "vaccine  - EA ADD'L VACCINE    7. Anemia due to vitamin B12 deficiency, unspecified B12 deficiency type  8. Anemia, unspecified type  Continue to monitor   - Vitamin B12  - CBC with platelets differential  - Ferritin    9. Osteoporosis, unspecified osteoporosis type, unspecified pathological fracture presence  New dx based on last dexa scan. Discussed risks vs benefits of Fosamax. Patient understands and would like to start medication. Reviewed timing of taking, onset, benefits, monitoring and typical and severe AE of the medication. dexa in 2 years from last.   - alendronate (FOSAMAX) 70 MG tablet; Take 1 tablet (70 mg) by mouth every 7 days  Dispense: 12 tablet; Refill: 3    COUNSELING:   Reviewed preventive health counseling, as reflected in patient instructions  Special attention given to:        Regular exercise       Healthy diet/nutrition       Vision screening       Hearing screening       Osteoporosis Prevention/Bone Health       Colon cancer screening       HIV screeninx in teen years, 1x in adult years, and at intervals if high risk    BP Readings from Last 1 Encounters:   19 108/75     Estimated body mass index is 18.36 kg/m  as calculated from the following:    Height as of this encounter: 1.588 m (5' 2.5\").    Weight as of this encounter: 46.3 kg (102 lb).           reports that  has never smoked. she has never used smokeless tobacco.    Patient Instructions   Schedule a medical assistant only visit for your Shingrix booster in 2-6 months.     Start a 500 mg calcium tablet daily. If you take calcium carbonate you need to take it with food. Calcium citrate does not need to be taken with food.    I recommend [place] take an over-the-counter Vitamin D supplement because we do not get enough sun. Take 1000 international units daily, but double check to see if this is in your multi-vitamin.     Take Fosamax in the morning once weekly with plenty of water and remain upright for 60 minutes, so " do not go back to bed or lie down. Wait to eat for 30-60 minutes.     Preventive Health Recommendations  Female Ages 50 - 64    Yearly exam: See your health care provider every year in order to  o Review health changes.   o Discuss preventive care.    o Review your medicines if your doctor has prescribed any.      Get a Pap test every three years (unless you have an abnormal result and your provider advises testing more often).    If you get Pap tests with HPV test, you only need to test every 5 years, unless you have an abnormal result.     You do not need a Pap test if your uterus was removed (hysterectomy) and you have not had cancer.    You should be tested each year for STDs (sexually transmitted diseases) if you're at risk.     Have a mammogram every 1 to 2 years.    Have a colonoscopy at age 50, or have a yearly FIT test (stool test). These exams screen for colon cancer.      Have a cholesterol test every 5 years, or more often if advised.    Have a diabetes test (fasting glucose) every three years. If you are at risk for diabetes, you should have this test more often.     If you are at risk for osteoporosis (brittle bone disease), think about having a bone density scan (DEXA).    Shots: Get a flu shot each year. Get a tetanus shot every 10 years.    Nutrition:     Eat at least 5 servings of fruits and vegetables each day.    Eat whole-grain bread, whole-wheat pasta and brown rice instead of white grains and rice.    Get adequate Calcium and Vitamin D.     Lifestyle    Exercise at least 150 minutes a week (30 minutes a day, 5 days a week). This will help you control your weight and prevent disease.    Limit alcohol to one drink per day.    No smoking.     Wear sunscreen to prevent skin cancer.     See your dentist every six months for an exam and cleaning.    See your eye doctor every 1 to 2 years.        Counseling Resources:  ATP IV Guidelines  Pooled Cohorts Equation Calculator  Breast Cancer Risk  Calculator  FRAX Risk Assessment  ICSI Preventive Guidelines  Dietary Guidelines for Americans, 2010  USDA's MyPlate  ASA Prophylaxis  Lung CA Screening    The information in this document, created by the medical scribe for me, accurately reflects the services I personally performed and the decisions made by me. I have reviewed and approved this document for accuracy.   Emma Guerra MD  [facility] Influenza Immunization Documentation    1.  Is the person to be vaccinated sick today?   No    2. Does the person to be vaccinated have an allergy to a component   of the vaccine?   No  Egg Allergy Algorithm Link    3. Has the person to be vaccinated ever had a serious reaction   to influenza vaccine in the past?   No    4. Has the person to be vaccinated ever had Guillain-Barré syndrome?   No    Form completed by   Monique Carlson MA

## 2019-03-18 NOTE — NURSING NOTE
Screening Questionnaire for Adult Immunization    Are you sick today?   No   Do you have allergies to medications, food, a vaccine component or latex?   No   Have you ever had a serious reaction after receiving a vaccination?   No   Do you have a long-term health problem with heart disease, lung disease, asthma, kidney disease, metabolic disease (e.g. diabetes), anemia, or other blood disorder?   No   Do you have cancer, leukemia, HIV/AIDS, or any other immune system problem?   No   In the past 3 months, have you taken medications that affect  your immune system, such as prednisone, other steroids, or anticancer drugs; drugs for the treatment of rheumatoid arthritis, Crohn s disease, or psoriasis; or have you had radiation treatments?   No   Have you had a seizure, or a brain or other nervous system problem?   No   During the past year, have you received a transfusion of blood or blood     products, or been given immune (gamma) globulin or antiviral drug?   No   For women: Are you pregnant or is there a chance you could become        pregnant during the next month?   No   Have you received any vaccinations in the past 4 weeks?   No     Immunization questionnaire answers were all negative.         Patient instructed to remain in clinic for 15 minutes afterwards, and to report any adverse reaction to me immediately.       Screening performed by Monique Carlson on 3/18/2019 at 11:30 AM.

## 2019-03-18 NOTE — PATIENT INSTRUCTIONS
Schedule a medical assistant only visit for your Shingrix booster in 2-6 months.     Start a 500 mg calcium tablet daily. If you take calcium carbonate you need to take it with food. Calcium citrate does not need to be taken with food.    I recommend [place] take an over-the-counter Vitamin D supplement because we do not get enough sun. Take 1000 international units daily, but double check to see if this is in your multi-vitamin.     Take Fosamax in the morning once weekly with plenty of water and remain upright for 60 minutes, so do not go back to bed or lie down. Wait to eat for 30-60 minutes.     You can try over the counter Replens as a vaginal moisturizer.     Preventive Health Recommendations  Female Ages 50 - 64    Yearly exam: See your health care provider every year in order to  o Review health changes.   o Discuss preventive care.    o Review your medicines if your doctor has prescribed any.      Get a Pap test every three years (unless you have an abnormal result and your provider advises testing more often).    If you get Pap tests with HPV test, you only need to test every 5 years, unless you have an abnormal result.     You do not need a Pap test if your uterus was removed (hysterectomy) and you have not had cancer.    You should be tested each year for STDs (sexually transmitted diseases) if you're at risk.     Have a mammogram every 1 to 2 years.    Have a colonoscopy at age 50, or have a yearly FIT test (stool test). These exams screen for colon cancer.      Have a cholesterol test every 5 years, or more often if advised.    Have a diabetes test (fasting glucose) every three years. If you are at risk for diabetes, you should have this test more often.     If you are at risk for osteoporosis (brittle bone disease), think about having a bone density scan (DEXA).    Shots: Get a flu shot each year. Get a tetanus shot every 10 years.    Nutrition:     Eat at least 5 servings of fruits and vegetables  each day.    Eat whole-grain bread, whole-wheat pasta and brown rice instead of white grains and rice.    Get adequate Calcium and Vitamin D.     Lifestyle    Exercise at least 150 minutes a week (30 minutes a day, 5 days a week). This will help you control your weight and prevent disease.    Limit alcohol to one drink per day.    No smoking.     Wear sunscreen to prevent skin cancer.     See your dentist every six months for an exam and cleaning.    See your eye doctor every 1 to 2 years.

## 2019-03-19 PROBLEM — M81.0 OSTEOPOROSIS, UNSPECIFIED OSTEOPOROSIS TYPE, UNSPECIFIED PATHOLOGICAL FRACTURE PRESENCE: Status: ACTIVE | Noted: 2019-03-19

## 2019-03-19 LAB — HIV 1+2 AB+HIV1 P24 AG SERPL QL IA: NONREACTIVE

## 2019-03-20 LAB
COPATH REPORT: NORMAL
PAP: NORMAL

## 2019-03-21 LAB
FINAL DIAGNOSIS: NORMAL
HPV HR 12 DNA CVX QL NAA+PROBE: NEGATIVE
HPV16 DNA SPEC QL NAA+PROBE: NEGATIVE
HPV18 DNA SPEC QL NAA+PROBE: NEGATIVE
SPECIMEN DESCRIPTION: NORMAL
SPECIMEN SOURCE CVX/VAG CYTO: NORMAL

## 2019-07-09 ENCOUNTER — OFFICE VISIT (OUTPATIENT)
Dept: DERMATOLOGY | Facility: CLINIC | Age: 64
End: 2019-07-09
Attending: FAMILY MEDICINE
Payer: COMMERCIAL

## 2019-07-09 DIAGNOSIS — D22.9 MULTIPLE BENIGN NEVI: ICD-10-CM

## 2019-07-09 DIAGNOSIS — D23.9 DERMATOFIBROMA: ICD-10-CM

## 2019-07-09 DIAGNOSIS — L81.4 SOLAR LENTIGO: Primary | ICD-10-CM

## 2019-07-09 DIAGNOSIS — L57.8 SUN-DAMAGED SKIN: ICD-10-CM

## 2019-07-09 DIAGNOSIS — D48.9 NEOPLASM OF UNCERTAIN BEHAVIOR: ICD-10-CM

## 2019-07-09 PROCEDURE — 11102 TANGNTL BX SKIN SINGLE LES: CPT | Performed by: DERMATOLOGY

## 2019-07-09 PROCEDURE — 99203 OFFICE O/P NEW LOW 30 MIN: CPT | Mod: 25 | Performed by: DERMATOLOGY

## 2019-07-09 PROCEDURE — 88305 TISSUE EXAM BY PATHOLOGIST: CPT | Mod: TC | Performed by: DERMATOLOGY

## 2019-07-09 NOTE — PROGRESS NOTES
"[facility] Dermatology Note      Dermatology Problem List:  1. NUB, right upper cutaneous lip, s/p biopsy 7/9/2019    Encounter Date: Jul 9, 2019    CC:  Chief Complaint   Patient presents with     Skin Check     [facility], no hx or family hx of skin cancer, never had a skin check, no areas of concern         History of Present Illness:  Ms. Lee \"ADRIANA\" JEREMY Craven is a 63 year old female who presents in self referral for Dr. Guerra for a skin check. She has no personal or family history of skin cancer. She has never had a skin check. She wants to get a baseline today and ensure she does not have any cancerous lesions. Denies any tender, nonhealing, bleeding skin lesions. When asked about the spot on her right cheek, she reports it has not changed and she has had it forever. When asked about a spot on her upper lip, she states it has been present for some time, is rough in texture, and seems to come and go. It has been present for a few months. Patient enjoys time outside. After retiring, her and her  built a cabin to live in. No other concerns addressed today.      Past Medical History:   Patient Active Problem List   Diagnosis     CARDIOVASCULAR SCREENING; LDL GOAL LESS THAN 160     Anemia     B12 deficiency anemia     Advanced directives, counseling/discussion     Osteopenia     Osteoporosis, unspecified osteoporosis type, unspecified pathological fracture presence     Past Medical History:   Diagnosis Date     Anemia      NO ACTIVE PROBLEMS      Other dyschromia      Past Surgical History:   Procedure Laterality Date     COLONOSCOPY       ENDOSCOPY UPPER, COLONOSCOPY, COMBINED  12/2/2011    Procedure:COMBINED ENDOSCOPY UPPER, COLONOSCOPY; Colonoscopy with Snare polypectomy, Esophagogastroduodenoscopy with duodenal and gastric biopsy; Surgeon:MONTY WOOD; Location: OR      CAPSULE ENDOSCOPY  12/15/2011    Procedure:CAPSULE/PILL CAM ENDOSCOPY; Surgeon:NEEMA INMAN; " Location: GI     wisdom teeth  as teen       Social History:  Patient is a retired educator. She has a history of tanning bed use before vacations.  Patient is  with 3 grown kids. She spends a lot of time outdoors. Wears sunscreen sometimes.     Family History:  No family history of skin cancer.    Medications:  Current Outpatient Medications   Medication Sig Dispense Refill     alendronate (FOSAMAX) 70 MG tablet Take 1 tablet (70 mg) by mouth every 7 days 12 tablet 3     Multiple Vitamin (MULTIVITAMIN OR) Take 1 tablet by mouth daily.         No Known Allergies    Review of Systems:  -Constitutional: Patient is otherwise feeling well, in usual state of health.   -Skin: As above in HPI. No additional skin concerns.    Physical exam:  Vitals: There were no vitals taken for this visit.  GEN: This is a well developed, well-nourished female in no acute distress, in a pleasant mood.    SKIN: Total skin excluding the undergarment areas was performed. The exam included the head/face, neck, both arms, chest, back, abdomen, both legs, digits and/or nails and buttocks.   - Foster Type III  - Extremely tanned skin on exam  - Scattered brown macules on sun exposed areas. Darker solar lentigines at the central chest.  - right upper cutaneous lip, hyperkeratotic pink to skin colored papule   - dermal nevus on the right medial cheek  - Multiple regular brown pigmented macules and papules are identified on the trunk.   -There is a firm tan/flesh colored papule that dimples with lateral pressure on the left shin x 2.  - No other lesions of concern on areas examined.             Impression/Plan:  1. Sun damaged skin with solar lentigines    Recommend sunscreens SPF #30 or greater, protective clothing and avoidance of tanning beds.    Discussed base tan myth, recommended avoidance of any further indoor tanning    Discussed that all tans are signs of sun damage    2. Benign findings including:  dermatofibroma    No  further intervention required. Patient to report changes.     Patient reassured of the benign nature of these lesions.    3. Multiple clinically benign nevi    No further intervention required. Patient to report changes.     Patient reassured of the benign nature of these lesions.    4. NUB, right upper cutaneous lip, hyperkeratotic pink to skin colored papule Ddx: NMSC vs AK vs verrucous keratosis vs verrucous vulgaris    Shave biopsy:  After discussion of benefits and risks including but not limited to bleeding/bruising, pain/swelling, infection, scar, incomplete removal, nerve damage/numbness, recurrence, and non-diagnostic biopsy, written consent, verbal consent and photographs were obtained. Time-out was performed. The area was cleaned with isopropyl alcohol. 0.5ml of 1% lidocaine with 1:100,000 epinephrine was injected to obtain adequate anesthesia. A shave biopsy was performed. Hemostasis was achieved with aluminium chloride. Vaseline and a sterile dressing were applied. The patient tolerated the procedure and no complications were noted. The patient was provided with verbal and written post care instructions.    CC Emma Guerra MD on close of this encounter.  Follow-up pending biopsy results, 1 year for skin exam, sooner for lesions of concern.         Staff Involved:  Scribe/Staff    Scribe Disclosure  I, Shima Henson, am serving as a scribe to document services personally performed by Dr. Odalis Pizarro MD, based on data collection and the provider's statements to me.     Provider Disclosure:   The documentation recorded by the scribe accurately reflects the services I personally performed and the decisions made by me.    Odalis Pizarro MD    Department of Dermatology  [facility]: Phone: [phone], Fax:[phone]  [facility]: Phone: [phone], Fax:  [phone]

## 2019-07-09 NOTE — NURSING NOTE
The following medication was given:     MEDICATION:  Lidocaine with epinephrine 1% [id number]  ROUTE: SQ  SITE: see procedure note  DOSE: see procedure not  LOT #: -[id number]  : [employer]  EXPIRATION DATE: 4/1/2020  NDC#: 5794-8562-04   Was there drug waste? No  Multi-dose vial: Yes    Maria Del Carmen Ziegler LPN  July 9, 2019

## 2019-07-09 NOTE — PATIENT INSTRUCTIONS

## 2019-07-09 NOTE — LETTER
"    7/9/2019         RE: Rosa Craven  [id number] [facility] [address]        Dear Colleague,    Thank you for referring your patient, Rosa Craven, to the [facility]. Please see a copy of my visit note below.    [facility] Dermatology Note      Dermatology Problem List:  1. NUB, right upper cutaneous lip, s/p biopsy 7/9/2019    Encounter Date: Jul 9, 2019    CC:  Chief Complaint   Patient presents with     Skin Check     [facility], no hx or family hx of skin cancer, never had a skin check, no areas of concern         History of Present Illness:  Ms. Lee \"ADRIANA\" JEREMY Craven is a 63 year old female who presents in self referral for Dr. Guerra for a skin check. She has no personal or family history of skin cancer. She has never had a skin check. She wants to get a baseline today and ensure she does not have any cancerous lesions. Denies any tender, nonhealing, bleeding skin lesions. When asked about the spot on her right cheek, she reports it has not changed and she has had it forever. When asked about a spot on her upper lip, she states it has been present for some time, is rough in texture, and seems to come and go. It has been present for a few months. Patient enjoys time outside. After retiring, her and her  built a cabin to live in. No other concerns addressed today.      Past Medical History:   Patient Active Problem List   Diagnosis     CARDIOVASCULAR SCREENING; LDL GOAL LESS THAN 160     Anemia     B12 deficiency anemia     Advanced directives, counseling/discussion     Osteopenia     Osteoporosis, unspecified osteoporosis type, unspecified pathological fracture presence     Past Medical History:   Diagnosis Date     Anemia      NO ACTIVE PROBLEMS      Other dyschromia      Past Surgical History:   Procedure Laterality Date     COLONOSCOPY       ENDOSCOPY UPPER, COLONOSCOPY, COMBINED  12/2/2011    Procedure:COMBINED ENDOSCOPY UPPER, " COLONOSCOPY; Colonoscopy with Snare polypectomy, Esophagogastroduodenoscopy with duodenal and gastric biopsy; Surgeon:MONTY WOOD; Location: OR      CAPSULE ENDOSCOPY  12/15/2011    Procedure:CAPSULE/PILL CAM ENDOSCOPY; Surgeon:NEEMA INMAN; Location: GI     wisdom teeth  as teen       Social History:  Patient is a retired educator. She has a history of tanning bed use before vacations.  Patient is  with 3 grown kids. She spends a lot of time outdoors. Wears sunscreen sometimes.     Family History:  No family history of skin cancer.    Medications:  Current Outpatient Medications   Medication Sig Dispense Refill     alendronate (FOSAMAX) 70 MG tablet Take 1 tablet (70 mg) by mouth every 7 days 12 tablet 3     Multiple Vitamin (MULTIVITAMIN OR) Take 1 tablet by mouth daily.         No Known Allergies    Review of Systems:  -Constitutional: Patient is otherwise feeling well, in usual state of health.   -Skin: As above in HPI. No additional skin concerns.    Physical exam:  Vitals: There were no vitals taken for this visit.  GEN: This is a well developed, well-nourished female in no acute distress, in a pleasant mood.    SKIN: Total skin excluding the undergarment areas was performed. The exam included the head/face, neck, both arms, chest, back, abdomen, both legs, digits and/or nails and buttocks.   - Foster Type III  - Extremely tanned skin on exam  - Scattered brown macules on sun exposed areas. Darker solar lentigines at the central chest.  - right upper cutaneous lip, hyperkeratotic pink to skin colored papule   - dermal nevus on the right medial cheek  - Multiple regular brown pigmented macules and papules are identified on the trunk.   -There is a firm tan/flesh colored papule that dimples with lateral pressure on the left shin x 2.  - No other lesions of concern on areas examined.             Impression/Plan:  1. Sun damaged skin with solar lentigines    Recommend sunscreens SPF  #30 or greater, protective clothing and avoidance of tanning beds.    Discussed base tan myth, recommended avoidance of any further indoor tanning    Discussed that all tans are signs of sun damage    2. Benign findings including:  dermatofibroma    No further intervention required. Patient to report changes.     Patient reassured of the benign nature of these lesions.    3. Multiple clinically benign nevi    No further intervention required. Patient to report changes.     Patient reassured of the benign nature of these lesions.    4. NUB, right upper cutaneous lip, hyperkeratotic pink to skin colored papule Ddx: NMSC vs AK vs verrucous keratosis vs verrucous vulgaris    Shave biopsy:  After discussion of benefits and risks including but not limited to bleeding/bruising, pain/swelling, infection, scar, incomplete removal, nerve damage/numbness, recurrence, and non-diagnostic biopsy, written consent, verbal consent and photographs were obtained. Time-out was performed. The area was cleaned with isopropyl alcohol. 0.5ml of 1% lidocaine with 1:100,000 epinephrine was injected to obtain adequate anesthesia. A shave biopsy was performed. Hemostasis was achieved with aluminium chloride. Vaseline and a sterile dressing were applied. The patient tolerated the procedure and no complications were noted. The patient was provided with verbal and written post care instructions.    CC Emma Guerra MD on close of this encounter.  Follow-up pending biopsy results, 1 year for skin exam, sooner for lesions of concern.         Staff Involved:  Scribe/Staff    Scribe Disclosure  I, Shima Henson, am serving as a scribe to document services personally performed by Dr. Odalis Pizarro MD, based on data collection and the provider's statements to me.     Provider Disclosure:   The documentation recorded by the scribe accurately reflects the services I personally performed and the decisions made by me.    Odalis Pizarro,  MD    Department of Dermatology  [facility]: Phone: [phone], Fax:[phone]  [facility]: Phone: [phone], Fax: [phone]              Again, thank you for allowing me to participate in the care of your patient.        Sincerely,        Odalis Pizarro MD

## 2019-07-09 NOTE — NURSING NOTE
Chief Complaint   Patient presents with     Skin Check     [facility], no hx or family hx of skin cancer, never had a skin check, no areas of concern       She requests these members of her care team be copied on today's visit information: no    PCP: Emma Guerra    Referring Provider:  Emma Guerra MD  [address] [facility] [place] [address]    There were no vitals taken for this visit.    Do you need any medication refills at today's visit? No    Maria Del Carmen Ziegler LPN

## 2019-07-17 ENCOUNTER — TELEPHONE (OUTPATIENT)
Dept: DERMATOLOGY | Facility: CLINIC | Age: 64
End: 2019-07-17

## 2019-07-17 LAB — COPATH REPORT: NORMAL

## 2019-07-17 NOTE — TELEPHONE ENCOUNTER
----- Notes recorded by Carolynn Caballero CMA on 7/17/2019 at 3:15 PM CDT  Patient returned call and I informed her of her results.  She did not have any questions.    Carolynn Caballero CMA    ------    Notes recorded by Carolynn Caballero CMA on 7/17/2019 at 2:23 PM CDT  Left message for patient to call [phone].    Carolynn Caballero CMA    ------    Notes recorded by Odalis Pizarro MD on 7/17/2019 at 12:35 PM CDT  Please let patient know biopsy was not entirely diagnostic of one condition, but it did not show any features of skin cancer. 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 If the lesion recurs after biopsy, please have patient schedule an appointment to recheck the area.     Odalis Pizarro MD    Department of Dermatology  [facility]: Phone: [phone], Fax:[phone]  [facility]: Phone: [phone], Fax: [phone]

## 2019-08-01 DIAGNOSIS — Z12.39 SCREENING FOR BREAST CANCER: ICD-10-CM

## 2019-09-28 ENCOUNTER — HEALTH MAINTENANCE LETTER (OUTPATIENT)
Age: 64
End: 2019-09-28

## 2019-12-06 ENCOUNTER — VIRTUAL VISIT (OUTPATIENT)
Dept: FAMILY MEDICINE | Facility: OTHER | Age: 64
End: 2019-12-06

## 2019-12-06 NOTE — PROGRESS NOTES
"Date: 2019 08:40:48  Clinician: Marga Watters  Clinician NPI: [id number]  Patient: Rosa Craven  Patient : 1955  Patient Address: [address] [place] [address]  Patient Phone: [phone]  Visit Protocol: UTI  Patient Summary:  Rosa is a 64 year old ( : 1955 ) female who initiated a Visit for a presumed bladder infection. When asked the question \"Please sign me up to receive news, health information and promotions from [employer].\", Rosa responded \"Yes\".   Her symptoms started 1-3 days ago and consist of dysuria, urgency, urinary incontinence, vaginal itching, foul-smelling urine, feeling as if the bladder is never empty, and urinary frequency.   Symptom details   Urine color: The color of her urine is orange.    Denied symptoms include vaginal discharge, vomiting, nausea, flank pain, abdominal pain, and chills. She does not feel feverish.   Over-the-counter medications or home remedies used to relieve the current symptoms as reported by the patient (free text): Monistat 7   Precipitating events  Rosa denies having a sexually transmitted disease.  Pertinent medical history  Rosa has had a bladder infection before but has not had any in the past 12 months. Her current symptoms are similar to her previous bladder infection symptoms.   She is not sure what antibiotics have been effective in treating her past bladder infections.   Rosa has not been prescribed antibiotics to prevent frequent or repeated bladder infections in the past and does not get yeast infections when she takes antibiotics. She has not experienced problems or side effects with any of the common antibiotics used to treat bladder infections.   Rosa does not have a history of kidney stones. She has not used a catheter or been a patient in a hospital or nursing home in the past 2 weeks.   Rosa does not smoke or use smokeless tobacco.     MEDICATIONS: Fosamax oral, ALLERGIES: NKDA  Clinician " Response:  Dear Rosa,  Based on the information you have provided, you likely have an acute urinary tract infection, also called a bladder infection. Bladder infections occur when bacteria from the outside of the body enters the urinary tract. Any part of the urinary system can be infected, but the bladder is the most common.  Medication information  I am prescribing:     Nitrofurantoin monohyd/m-cryst (Macrobid) 100 mg oral capsule. Take 1 capsule by mouth every 12 hours for 5 days. Take this medication with food. There are no refills with this prescription.   The medication I prescribed for your bladder infection is an antibiotic. Continue taking the medication until it is gone even if you feel better.   Yeast infections can be a common side effect of antibiotics. The most common symptom of a yeast infection is itchiness in and around the vagina. Other signs and symptoms include burning, redness, or a thick, white vaginal discharge that looks like cottage cheese and does not have a bad smell.  Self care  Urination helps to flush bacteria from the urinary tract. For this reason, drinking water and urinating often helps relieve some urinary symptoms and can decrease your risk of getting bladder infections in the future.  Other steps you can take to prevent future bladder infections include:     Wipe front to back after using the bathroom    Urinate after sexual intercourse    Avoid using deodorant sprays, douches, or powders in the vaginal area     When to seek care  Please make an appointment to be seen in a clinic or urgent care if any of the following occur:     You develop new symptoms or your symptoms become worse    You have medication side effects that make it difficult to take them as prescribed    Your symptoms do not improve within 1-2 days of starting treatment    You have symptoms of a bladder infection that return shortly after completing treatment     It is possible to have an allergic reaction to  an antibiotic even if you have not had one in the past. If you notice a new rash, significant swelling, or difficulty breathing, stop taking this medication immediately and go to a clinic or urgent care.   Diagnosis: Acute uncomplicated bladder infection  Diagnosis ICD: N39.0  Prescription: nitrofurantoin monohyd/m-cryst (Macrobid) 100 mg oral capsule 10 capsule, 5 days supply. Take 1 capsule by mouth every 12 hours for 5 days. Refills: 0, Refill as needed: no, Allow substitutions: yes  Pharmacy: [facility] [id number] [facility] - [phone] - [address] [place] [address]

## 2019-12-27 ENCOUNTER — OFFICE VISIT (OUTPATIENT)
Dept: FAMILY MEDICINE | Facility: OTHER | Age: 64
End: 2019-12-27
Attending: PHYSICIAN ASSISTANT
Payer: COMMERCIAL

## 2019-12-27 VITALS
DIASTOLIC BLOOD PRESSURE: 74 MMHG | HEART RATE: 48 BPM | BODY MASS INDEX: 19.56 KG/M2 | TEMPERATURE: 97.8 F | WEIGHT: 103.6 LBS | OXYGEN SATURATION: 99 % | SYSTOLIC BLOOD PRESSURE: 112 MMHG | RESPIRATION RATE: 16 BRPM | HEIGHT: 61 IN

## 2019-12-27 DIAGNOSIS — R30.9 PAIN PASSING URINE: ICD-10-CM

## 2019-12-27 DIAGNOSIS — R39.89 SUSPECTED UTI: Primary | ICD-10-CM

## 2019-12-27 LAB
ALBUMIN UR-MCNC: NEGATIVE MG/DL
APPEARANCE UR: CLEAR
BACTERIA #/AREA URNS HPF: ABNORMAL /HPF
BILIRUB UR QL STRIP: NEGATIVE
COLOR UR AUTO: YELLOW
GLUCOSE UR STRIP-MCNC: NEGATIVE MG/DL
HGB UR QL STRIP: ABNORMAL
KETONES UR STRIP-MCNC: NEGATIVE MG/DL
LEUKOCYTE ESTERASE UR QL STRIP: NEGATIVE
MUCOUS THREADS #/AREA URNS LPF: PRESENT /LPF
NITRATE UR QL: NEGATIVE
NON-SQ EPI CELLS #/AREA URNS LPF: ABNORMAL /LPF
PH UR STRIP: 5.5 PH (ref 5–9)
RBC #/AREA URNS AUTO: ABNORMAL /HPF
SOURCE: ABNORMAL
SP GR UR STRIP: >1.03 (ref 1–1.03)
UROBILINOGEN UR STRIP-ACNC: 0.2 EU/DL (ref 0.2–1)
WBC #/AREA URNS AUTO: ABNORMAL /HPF

## 2019-12-27 PROCEDURE — 99203 OFFICE O/P NEW LOW 30 MIN: CPT | Performed by: PHYSICIAN ASSISTANT

## 2019-12-27 PROCEDURE — 81001 URINALYSIS AUTO W/SCOPE: CPT | Mod: ZL | Performed by: PHYSICIAN ASSISTANT

## 2019-12-27 PROCEDURE — 87086 URINE CULTURE/COLONY COUNT: CPT | Mod: ZL | Performed by: PHYSICIAN ASSISTANT

## 2019-12-27 RX ORDER — SULFAMETHOXAZOLE/TRIMETHOPRIM 800-160 MG
1 TABLET ORAL 2 TIMES DAILY
Qty: 14 TABLET | Refills: 0 | Status: SHIPPED | OUTPATIENT
Start: 2019-12-27 | End: 2020-01-17

## 2019-12-27 RX ORDER — SULFAMETHOXAZOLE/TRIMETHOPRIM 800-160 MG
1 TABLET ORAL 2 TIMES DAILY
Qty: 14 TABLET | Refills: 0 | Status: SHIPPED | OUTPATIENT
Start: 2019-12-27 | End: 2019-12-27

## 2019-12-27 ASSESSMENT — MIFFLIN-ST. JEOR: SCORE: 967.31

## 2019-12-27 ASSESSMENT — PAIN SCALES - GENERAL: PAINLEVEL: NO PAIN (0)

## 2019-12-28 NOTE — PATIENT INSTRUCTIONS
"Urinalysis does show a bladder infection  Push fluids  Start bactrim DS, one tablet, twice daily for 5-7 days.   OTC AZO as directed  Pelvic rest and good hygiene. See AVS  Ibuprofen or Tylenol as indicated for discomfort or fever  Return to clinic if symptoms persist or worsen  Seek immediate care    Fever over 101 F (38.3 C)    No improvement by the third day of treatment    Increasing back or abdominal pain    Repeated vomiting; unable to keep medicine down    Weakness, dizziness or fainting    Vaginal discharge    Pain, redness or swelling in the labia (outer vaginal area)    Patient Education     Bladder Infection, Female (Adult)    Urine is normally doesn't have any bacteria in it. But bacteria can get into the urinary tract from the skin around the rectum. Or they can travel in the blood from elsewhere in the body. Once they are in your urinary tract, they can cause infection in the urethra (urethritis), the bladder (cystitis), or the kidneys (pyelonephritis).  The most common place for an infection is in the bladder. This is called a bladder infection. This is one of the most common infections in women. Most bladder infections are easily treated. They are not serious unless the infection spreads to the kidney.  The phrases \"bladder infection,\" \"UTI,\" and \"cystitis\" are often used to describe the same thing. But they are not always the same. Cystitis is an inflammation of the bladder. The most common cause of cystitis is an infection.  Symptoms  The infection causes inflammation in the urethra and bladder. This causes many of the symptoms. The most common symptoms of a bladder infection are:    Pain or burning when urinating    Having to urinate more often than usual    Urgent need to urinate    Only a small amount of urine comes out    Blood in urine    Abdominal discomfort. This is usually in the lower abdomen above the pubic bone.    Cloudy urine    Strong- or bad-smelling urine    Unable to urinate " (urinary retention)    Unable to hold urine in (urinary incontinence)    Fever    Loss of appetite    Confusion (in older adults)  Causes  Bladder infections are not contagious. You can't get one from someone else, from a toilet seat, or from sharing a bath.  The most common cause of bladder infections is bacteria from the bowels. The bacteria get onto the skin around the opening of the urethra. From there, they can get into the urine and travel up to the bladder, causing inflammation and infection. This usually happens because of:    Wiping improperly after urinating. Always wipe from front to back.    Bowel incontinence    Pregnancy    Procedures such as having a catheter inserted    Older age    Not emptying your bladder. This can allow bacteria a chance to grow in your urine.    Dehydration    Constipation    Sex    Use of a diaphragm for birth control   Treatment  Bladder infections are diagnosed by a urine test. They are treated with antibiotics and usually clear up quickly without complications. Treatment helps prevent a more serious kidney infection.  Medicines  Medicines can help in the treatment of a bladder infection:    Take antibiotics until they are used up, even if you feel better. It is important to finish them to make sure the infection has cleared.    You can use acetaminophen or ibuprofen for pain, fever, or discomfort, unless another medicine was prescribed. If you have chronic liver or kidney disease, talk with your healthcare provider before using these medicines. Also talk with your provider if you've ever had a stomach ulcer or gastrointestinal bleeding, or are taking blood-thinner medicines.    If you are given phenazopydridine to reduce burning with urination, it will cause your urine to become a bright orange color. This can stain clothing.  Care and prevention  These self-care steps can help prevent future infections:    Drink plenty of fluids to prevent dehydration and flush out your  bladder. Do this unless you must restrict fluids for other health reasons, or your doctor told you not to.    Proper cleaning after going to the bathroom is important. Wipe from front to back after using the toilet to prevent the spread of bacteria.    Urinate more often. Don't try to hold urine in for a long time.    Wear loose-fitting clothes and cotton underwear. Avoid tight-fitting pants.    Improve your diet and prevent constipation. Eat more fresh fruit and vegetables, and fiber, and less junk and fatty foods.    Avoid sex until your symptoms are gone.    Avoid caffeine, alcohol, and spicy foods. These can irritate your bladder.    Urinate right after intercourse to flush out your bladder.    If you use birth control pills and have frequent bladder infections, discuss it with your doctor.  Follow-up care  Call your healthcare provider if all symptoms are not gone after 3 days of treatment. This is especially important if you have repeat infections.  If a culture was done, you will be told if your treatment needs to be changed. If directed, you can call to find out the results.  If X-rays were done, you will be told if the results will affect your treatment.  Call 911  Call 911 if any of the following occur:    Trouble breathing    Hard to wake up or confusion    Fainting or loss of consciousness    Rapid heart rate  When to seek medical advice  Call your healthcare provider right away if any of these occur:    Fever of 100.4 F (38.0 C) or higher, or as directed by your healthcare provider    Symptoms are not better by the third day of treatment    Back or belly (abdominal) pain that gets worse    Repeated vomiting, or unable to keep medicine down    Weakness or dizziness    Vaginal discharge    Pain, redness, or swelling in the outer vaginal area (labia)  Date Last Reviewed: 10/1/2016    5274-8179 [employer]. [address] [place] [address], [place] [address]. All rights reserved. This information is not  intended as a substitute for professional medical care. Always follow your healthcare professional's instructions.

## 2019-12-28 NOTE — PROGRESS NOTES
"SUBJECTIVE: Rosa Craven is a 64 year old female who complains of urinary frequency, dysuria, lower abdominal pain, low back pain. She also works out and could have strained a back muscle  Onset: 2 weeks ago, course has been waxing and waning, was worse this AM  Associated symptoms as above  No fever, has fatigue    Treatments - tylenol today, increased fluids  History of UTI - last UTI 12/6/19 she was on macrobid x 5 days. She was treated online for this. She got better, but symptoms did not completely resolve.   Vaginal discomfort, discharge - negative  History of kidney stone, kidney infection, kidney disease - none  LMP - post menopausal  BM - normal    Past Medical History:   Diagnosis Date     Anemia      NO ACTIVE PROBLEMS      Other dyschromia      Current Outpatient Medications   Medication     alendronate (FOSAMAX) 70 MG tablet     Multiple Vitamin (MULTIVITAMIN OR)     No current facility-administered medications for this visit.       No Known Allergies      ROS  General: feels ill today, no fever  Abdomen:  Mild discomforty  : POSITIVE - per HPI    OBJECTIVE:   Vitals:    12/27/19 1919   BP: 112/74   Pulse: (!) 48   Resp: 16   Temp: 97.8  F (36.6  C)   TempSrc: Tympanic   SpO2: 99%   Weight: 47 kg (103 lb 9.6 oz)   Height: 1.549 m (5' 1\")     Appears well, in no apparent distress.  Vital signs are normal.  Cardiac: normal RR, no murmur  Respiratory: normal respiration, clear to ausculation  Abdomen: soft, TTP suprapubic, non tender, No rigidity, no rebound. No guarding. No CVAT    Results for orders placed or performed in visit on 12/27/19   UA reflex to Microscopic and Culture     Status: Abnormal   Result Value Ref Range    Color Urine Yellow     Appearance Urine Clear     Glucose Urine Negative NEG^Negative mg/dL    Bilirubin Urine Negative NEG^Negative    Ketones Urine Negative NEG^Negative mg/dL    Specific Gravity Urine >1.030 (H) 1.000 - 1.030    Blood Urine Large (A) NEG^Negative    pH " Urine 5.5 5.0 - 9.0 pH    Protein Albumin Urine Negative NEG^Negative mg/dL    Urobilinogen Urine 0.2 0.2 - 1.0 EU/dL    Nitrite Urine Negative NEG^Negative    Leukocyte Esterase Urine Negative NEG^Negative    Source Midstream Urine    Urine Microscopic     Status: Abnormal   Result Value Ref Range    WBC Urine 5-10 (A) OTO5^0 - 5 /HPF    RBC Urine 2-5 (A) OTO2^O - 2 /HPF    Squamous Epithelial /LPF Urine Few FEW^Few /LPF    Bacteria Urine Few (A) NEG^Negative /HPF    Mucous Urine Present (A) NEG^Negative /LPF         ASSESSMENT:   (R39.89) Suspected UTI  (primary encounter diagnosis)  Plan: bactrim twice daily x 5-7 days    (R30.9) Pain passing urine  Plan: UA reflex to Microscopic and Culture, Urine         Microscopic    Dysuria and frequency on/off for 2 weeks, worse today  UA not definite for UTI - Blood - large, WBC 5-10, RBC 2-5, few bacteria, urine culture is pending  Denies vaginal discharge/discomfort - WET prep deferred  Patient would like to treat for suspected UTI, will notify patient if urine culture is negative to stop antibiotic  Symptomatic treatments per AVS  Follow up with PCP if symptoms persist or worsen  Patient received verbal and written instruction including review of warning signs    Fatoumata Rollins PA-C on 12/27/2019 at 9:12 PM

## 2019-12-28 NOTE — NURSING NOTE
"Chief Complaint   Patient presents with     Urinary Problem     Patient is here for low abd pain and frequency that has been happening for a few weeks. Patient states she has fatigue and a backache that started today. Patient was put on Macrobid on 12/6/19 for a bladder infection and she feels that it hasn't fully went away. Patient took Tylenol at 5pm with relief.     Initial /74   Pulse (!) 48   Temp 97.8  F (36.6  C) (Tympanic)   Resp 16   Ht 1.549 m (5' 1\")   Wt 47 kg (103 lb 9.6 oz)   SpO2 99%   BMI 19.58 kg/m   Estimated body mass index is 19.58 kg/m  as calculated from the following:    Height as of this encounter: 1.549 m (5' 1\").    Weight as of this encounter: 47 kg (103 lb 9.6 oz).  Medication Reconciliation: complete    Neva Wallace LPN  "

## 2019-12-30 LAB
BACTERIA SPEC CULT: NO GROWTH
SPECIMEN SOURCE: NORMAL

## 2020-01-17 ENCOUNTER — OFFICE VISIT (OUTPATIENT)
Dept: FAMILY MEDICINE | Facility: CLINIC | Age: 65
End: 2020-01-17
Payer: COMMERCIAL

## 2020-01-17 VITALS
OXYGEN SATURATION: 99 % | TEMPERATURE: 97.8 F | SYSTOLIC BLOOD PRESSURE: 134 MMHG | DIASTOLIC BLOOD PRESSURE: 88 MMHG | HEART RATE: 56 BPM | HEIGHT: 61 IN | BODY MASS INDEX: 19.45 KG/M2 | WEIGHT: 103 LBS | RESPIRATION RATE: 18 BRPM

## 2020-01-17 DIAGNOSIS — R30.0 DYSURIA: Primary | ICD-10-CM

## 2020-01-17 DIAGNOSIS — R10.9 FLANK PAIN: ICD-10-CM

## 2020-01-17 LAB
ALBUMIN UR-MCNC: NEGATIVE MG/DL
ANION GAP SERPL CALCULATED.3IONS-SCNC: 4 MMOL/L (ref 3–14)
APPEARANCE UR: CLEAR
BASOPHILS # BLD AUTO: 0.1 10E9/L (ref 0–0.2)
BASOPHILS NFR BLD AUTO: 1.3 %
BILIRUB UR QL STRIP: NEGATIVE
BUN SERPL-MCNC: 19 MG/DL (ref 7–30)
CALCIUM SERPL-MCNC: 9.1 MG/DL (ref 8.5–10.1)
CHLORIDE SERPL-SCNC: 105 MMOL/L (ref 94–109)
CO2 SERPL-SCNC: 30 MMOL/L (ref 20–32)
COLOR UR AUTO: YELLOW
CREAT SERPL-MCNC: 0.7 MG/DL (ref 0.52–1.04)
CRP SERPL-MCNC: 13.9 MG/L (ref 0–8)
DIFFERENTIAL METHOD BLD: NORMAL
EOSINOPHIL # BLD AUTO: 0.1 10E9/L (ref 0–0.7)
EOSINOPHIL NFR BLD AUTO: 3.3 %
ERYTHROCYTE [DISTWIDTH] IN BLOOD BY AUTOMATED COUNT: 12.7 % (ref 10–15)
GFR SERPL CREATININE-BSD FRML MDRD: >90 ML/MIN/{1.73_M2}
GLUCOSE SERPL-MCNC: 84 MG/DL (ref 70–99)
GLUCOSE UR STRIP-MCNC: NEGATIVE MG/DL
HCT VFR BLD AUTO: 39.5 % (ref 35–47)
HGB BLD-MCNC: 12.9 G/DL (ref 11.7–15.7)
HGB UR QL STRIP: ABNORMAL
HYALINE CASTS #/AREA URNS LPF: NORMAL /LPF
KETONES UR STRIP-MCNC: NEGATIVE MG/DL
LEUKOCYTE ESTERASE UR QL STRIP: ABNORMAL
LYMPHOCYTES # BLD AUTO: 1.1 10E9/L (ref 0.8–5.3)
LYMPHOCYTES NFR BLD AUTO: 28.5 %
MCH RBC QN AUTO: 30 PG (ref 26.5–33)
MCHC RBC AUTO-ENTMCNC: 32.7 G/DL (ref 31.5–36.5)
MCV RBC AUTO: 92 FL (ref 78–100)
MONOCYTES # BLD AUTO: 0.3 10E9/L (ref 0–1.3)
MONOCYTES NFR BLD AUTO: 8.6 %
NEUTROPHILS # BLD AUTO: 2.3 10E9/L (ref 1.6–8.3)
NEUTROPHILS NFR BLD AUTO: 58.3 %
NITRATE UR QL: NEGATIVE
NON-SQ EPI CELLS #/AREA URNS LPF: NORMAL /LPF
PH UR STRIP: 5.5 PH (ref 5–7)
PLATELET # BLD AUTO: 211 10E9/L (ref 150–450)
POTASSIUM SERPL-SCNC: 4.1 MMOL/L (ref 3.4–5.3)
RBC # BLD AUTO: 4.3 10E12/L (ref 3.8–5.2)
RBC #/AREA URNS AUTO: NORMAL /HPF
SODIUM SERPL-SCNC: 139 MMOL/L (ref 133–144)
SOURCE: ABNORMAL
SP GR UR STRIP: 1.01 (ref 1–1.03)
UROBILINOGEN UR STRIP-ACNC: 0.2 EU/DL (ref 0.2–1)
WBC # BLD AUTO: 4 10E9/L (ref 4–11)
WBC #/AREA URNS AUTO: NORMAL /HPF

## 2020-01-17 PROCEDURE — 99213 OFFICE O/P EST LOW 20 MIN: CPT | Performed by: FAMILY MEDICINE

## 2020-01-17 PROCEDURE — 36415 COLL VENOUS BLD VENIPUNCTURE: CPT | Performed by: FAMILY MEDICINE

## 2020-01-17 PROCEDURE — 86140 C-REACTIVE PROTEIN: CPT | Performed by: FAMILY MEDICINE

## 2020-01-17 PROCEDURE — 85025 COMPLETE CBC W/AUTO DIFF WBC: CPT | Performed by: FAMILY MEDICINE

## 2020-01-17 PROCEDURE — 87086 URINE CULTURE/COLONY COUNT: CPT | Performed by: FAMILY MEDICINE

## 2020-01-17 PROCEDURE — 80048 BASIC METABOLIC PNL TOTAL CA: CPT | Performed by: FAMILY MEDICINE

## 2020-01-17 PROCEDURE — 81001 URINALYSIS AUTO W/SCOPE: CPT | Performed by: FAMILY MEDICINE

## 2020-01-17 RX ORDER — CIPROFLOXACIN 500 MG/1
500 TABLET, FILM COATED ORAL 2 TIMES DAILY
Qty: 14 TABLET | Refills: 0 | Status: SHIPPED | OUTPATIENT
Start: 2020-01-17 | End: 2020-01-24

## 2020-01-17 ASSESSMENT — PAIN SCALES - GENERAL: PAINLEVEL: NO PAIN (0)

## 2020-01-17 ASSESSMENT — MIFFLIN-ST. JEOR: SCORE: 964.58

## 2020-01-17 NOTE — PATIENT INSTRUCTIONS
Start cipro if urinary symptoms return     Continue drinking lots of fluids  Ok to trial azo or cranberry juice     To the ER this weekend if fever, vomiting, flank pain.

## 2020-01-17 NOTE — PROGRESS NOTES
Subjective     Rosa Craven is a 64 year old female who presents to clinic today for the following health issues:    HPI   URINARY TRACT SYMPTOMS  Onset: Tuesday    Description:   Painful urination (Dysuria): YES           Frequency: YES  Blood in urine (Hematuria): no   Delay in urine (Hesitency): no     Intensity: mild    Progression of Symptoms:  intermittent    Accompanying Signs & Symptoms:  Fever/chills: YES  Flank pain YES  Nausea and vomiting: YES  Any vaginal symptoms: none  Abdominal/Pelvic Pain: YES    History:   History of frequent UTI's: no   History of kidney stones: no   Sexually Active: YES  Possibility of pregnancy: No    Precipitating factors:   Treated recently     Therapies Tried and outcome: Cranberry juice prn (contraindicated in Coumadin patients) and Increase fluid intake    Early Dec was treated for uti through a virtual visit with macrobid. Had persistent urgency still after the tx and around [place], symptoms intensified. Was seen 12/27/19 and treated with bactrim but rx stopped after 3 days as culture returned negative.     -Pt voices that last week her symptoms improved but returned Tues 1/15 with additional sx's of nausea, vomiting, and achiness to her right flank on the 16th.   -Today, patient states that she better and is no longer having any urinary symptoms or significant flank pain (just dull mild ache remains)      Patient Active Problem List   Diagnosis     CARDIOVASCULAR SCREENING; LDL GOAL LESS THAN 160     Anemia     B12 deficiency anemia     Advanced directives, counseling/discussion     Osteopenia     Osteoporosis, unspecified osteoporosis type, unspecified pathological fracture presence     Past Surgical History:   Procedure Laterality Date     COLONOSCOPY       ENDOSCOPY UPPER, COLONOSCOPY, COMBINED  12/2/2011    Procedure:COMBINED ENDOSCOPY UPPER, COLONOSCOPY; Colonoscopy with Snare polypectomy, Esophagogastroduodenoscopy with duodenal and gastric biopsy;  "Surgeon:MONTY WOOD; Location: OR      CAPSULE ENDOSCOPY  12/15/2011    Procedure:CAPSULE/PILL CAM ENDOSCOPY; Surgeon:NEEMA INMAN; Location: GI     wisdom teeth  as teen       Social History     Tobacco Use     Smoking status: Never Smoker     Smokeless tobacco: Never Used   Substance Use Topics     Alcohol use: No     Family History   Problem Relation Age of Onset     Cancer Mother 64        cervical CA     C.A.D. Father 46        MI (perhaps it was a PE as thought came from DVT)- smoked, overweight         Reviewed and updated as needed this visit by Provider  Tobacco  Allergies  Meds  Problems  Med Hx  Surg Hx  Fam Hx       Review of Systems   POSITIVE for urgency, back pain  10 point ROS of systems including Constitutional, Eyes, Respiratory, Cardiovascular, Gastroenterology, Genitourinary, Integumentary, Muscularskeletal, Psychiatric were all negative except for pertinent positives noted in my HPI.    This document serves as a record of the services and decisions personally performed and made by Emma Guerra MD. It was created on her behalf by Justyna Preston, trained medical scribe. The creation of this document is based on the provider's statements to the medical scribe.        Objective    /88 (BP Location: Right arm, Patient Position: Chair, Cuff Size: Adult Regular)   Pulse 56   Temp 97.8  F (36.6  C) (Oral)   Resp 18   Ht 1.549 m (5' 1\")   Wt 46.7 kg (103 lb)   LMP  (Exact Date)   SpO2 99%   Breastfeeding No   BMI 19.46 kg/m    Body mass index is 19.46 kg/m .  Physical Exam   GENERAL: healthy, alert and no distress  NECK: no adenopathy, no asymmetry, masses, or scars and thyroid normal to palpation  RESP: lungs clear to auscultation - no rales, rhonchi or wheezes  CV: regular rate and rhythm, normal S1 S2, no S3 or S4, no murmur, click or rub, no peripheral edema and peripheral pulses strong  ABDOMEN: soft, nontender, no hepatosplenomegaly, no " masses and bowel sounds normal  BACK: no CVA tenderness, no paralumbar tenderness    Diagnostic Test Results:  Labs reviewed in Epic  Results for orders placed or performed in visit on 01/17/20 (from the past 24 hour(s))   *UA reflex to Microscopic and Culture ([place] and [facility] (except [place] and [place])   Result Value Ref Range    Color Urine Yellow     Appearance Urine Clear     Glucose Urine Negative NEG^Negative mg/dL    Bilirubin Urine Negative NEG^Negative    Ketones Urine Negative NEG^Negative mg/dL    Specific Gravity Urine 1.010 1.003 - 1.035    Blood Urine Moderate (A) NEG^Negative    pH Urine 5.5 5.0 - 7.0 pH    Protein Albumin Urine Negative NEG^Negative mg/dL    Urobilinogen Urine 0.2 0.2 - 1.0 EU/dL    Nitrite Urine Negative NEG^Negative    Leukocyte Esterase Urine Trace (A) NEG^Negative    Source Midstream Urine    Urine Microscopic   Result Value Ref Range    WBC Urine 0 - 5 OTO5^0 - 5 /HPF    RBC Urine O - 2 OTO2^O - 2 /HPF    Hyaline Casts O - 2 OTO2^O - 2 /LPF    Squamous Epithelial /LPF Urine Few FEW^Few /LPF     Results for orders placed or performed in visit on 01/17/20   *UA reflex to Microscopic and Culture ([place] [facility] (except [place] and [place])     Status: Abnormal   Result Value Ref Range    Color Urine Yellow     Appearance Urine Clear     Glucose Urine Negative NEG^Negative mg/dL    Bilirubin Urine Negative NEG^Negative    Ketones Urine Negative NEG^Negative mg/dL    Specific Gravity Urine 1.010 1.003 - 1.035    Blood Urine Moderate (A) NEG^Negative    pH Urine 5.5 5.0 - 7.0 pH    Protein Albumin Urine Negative NEG^Negative mg/dL    Urobilinogen Urine 0.2 0.2 - 1.0 EU/dL    Nitrite Urine Negative NEG^Negative    Leukocyte Esterase Urine Trace (A) NEG^Negative    Source Midstream Urine    Urine Microscopic     Status: None   Result Value Ref Range    WBC Urine 0 - 5 OTO5^0 - 5 /HPF    RBC Urine O - 2 OTO2^O - 2 /HPF    Hyaline Casts O - 2 OTO2^O - 2  /LPF    Squamous Epithelial /LPF Urine Few FEW^Few /LPF           Assessment & Plan     1. Dysuria  2. Flank pain  Recent symptomatology worrisome for pyelonephritis. However, much improved currently. will await labs but have her start abx if any sx's recur.   - *UA reflex to Microscopic and Culture ([place] and [facility] (except [place] and [place])  - Urine Microscopic  - Urine Culture Aerobic Bacterial  - ciprofloxacin (CIPRO) 500 MG tablet; Take 1 tablet (500 mg) by mouth 2 times daily for 7 days  Dispense: 14 tablet; Refill: 0  - Basic metabolic panel  - CBC with platelets differential  - CRP inflammation       Patient Instructions   Start cipro if urinary symptoms return     Continue drinking lots of fluids  Ok to trial azo or cranberry juice     To the ER this weekend if fever, vomiting, flank pain.        Return in about 3 months (around 4/17/2020), or if symptoms worsen or fail to improve.    Length of visit was 20 minutes with more than 50 percent of that time used for discussing medical concerns and education    The information in this document, created by the medical scribe, Justyna Preston, for me, accurately reflects the services I personally performed and the decisions made by me. I have reviewed and approved this document for accuracy prior to leaving the patient care area. 12:51 PM 1/17/2020    Emma Guerra MD  [facility]

## 2020-01-18 LAB
BACTERIA SPEC CULT: NORMAL
SPECIMEN SOURCE: NORMAL

## 2020-01-19 ENCOUNTER — MYC MEDICAL ADVICE (OUTPATIENT)
Dept: FAMILY MEDICINE | Facility: CLINIC | Age: 65
End: 2020-01-19

## 2020-01-20 NOTE — TELEPHONE ENCOUNTER
Routing to provider to advise; patient was seen in clinic 1/117/20 and has lab questions.    Gordon Arriaza RN, BSN, PHN

## 2020-02-03 ENCOUNTER — MYC MEDICAL ADVICE (OUTPATIENT)
Dept: FAMILY MEDICINE | Facility: CLINIC | Age: 65
End: 2020-02-03

## 2020-02-04 ENCOUNTER — E-VISIT (OUTPATIENT)
Dept: FAMILY MEDICINE | Facility: CLINIC | Age: 65
End: 2020-02-04
Payer: COMMERCIAL

## 2020-02-04 DIAGNOSIS — N32.89 BLADDER SPASM: ICD-10-CM

## 2020-02-04 DIAGNOSIS — N39.0 RECURRENT UTI: Primary | ICD-10-CM

## 2020-02-04 DIAGNOSIS — N32.89 BLADDER SPASMS: ICD-10-CM

## 2020-02-04 PROCEDURE — 99421 OL DIG E/M SVC 5-10 MIN: CPT | Performed by: FAMILY MEDICINE

## 2020-02-05 ENCOUNTER — E-VISIT (OUTPATIENT)
Dept: FAMILY MEDICINE | Facility: CLINIC | Age: 65
End: 2020-02-05
Payer: COMMERCIAL

## 2020-02-05 ENCOUNTER — E-VISIT (OUTPATIENT)
Dept: FAMILY MEDICINE | Facility: CLINIC | Age: 65
End: 2020-02-05

## 2020-02-05 DIAGNOSIS — N39.0 RECURRENT UTI: ICD-10-CM

## 2020-02-05 DIAGNOSIS — N94.9 VAGINAL DISCOMFORT: Primary | ICD-10-CM

## 2020-02-05 PROCEDURE — 99207 ZZC NON-BILLABLE SERV PER CHARTING: CPT | Performed by: FAMILY MEDICINE

## 2020-02-06 DIAGNOSIS — N94.9 VAGINAL DISCOMFORT: ICD-10-CM

## 2020-02-06 LAB
SPECIMEN SOURCE: NORMAL
WET PREP SPEC: NORMAL

## 2020-02-06 PROCEDURE — 87210 SMEAR WET MOUNT SALINE/INK: CPT | Mod: ZL | Performed by: FAMILY MEDICINE

## 2020-02-06 NOTE — TELEPHONE ENCOUNTER
Provider E-Visit time total (minutes): 1 min  Already charged for e-visit earlier in day, will no charge as this is just a follow-up question

## 2020-02-14 DIAGNOSIS — M81.0 OSTEOPOROSIS, UNSPECIFIED OSTEOPOROSIS TYPE, UNSPECIFIED PATHOLOGICAL FRACTURE PRESENCE: ICD-10-CM

## 2020-02-14 RX ORDER — ALENDRONATE SODIUM 70 MG/1
70 TABLET ORAL
Qty: 12 TABLET | Refills: 1 | Status: SHIPPED | OUTPATIENT
Start: 2020-02-14 | End: 2020-08-27

## 2020-02-14 NOTE — TELEPHONE ENCOUNTER
Routing refill request to provider for review/approval because:      No Dexa on file within past 2 years    Vandana Aguirre RN

## 2020-02-14 NOTE — TELEPHONE ENCOUNTER
"Requested Prescriptions   Pending Prescriptions Disp Refills     alendronate (FOSAMAX) 70 MG tablet [Pharmacy Med Name: ALENDRONATE SODIUM 70 MG TAB]  Last Written Prescription Date:  3/18/19  Last Fill Quantity: 12 tablet,  # refills: 3   Last office visit: 1/17/2020 with prescribing provider:  Dr. Guerra   Future Office Visit:     12 tablet 3     Sig: TAKE 1 TABLET (70 MG) BY MOUTH EVERY 7 DAYS       Bisphosphonates Failed - 2/14/2020  2:04 AM        Failed - Dexa on file within past 2 years     Please review last Dexa result.           Passed - Recent (12 mo) or future (30 days) visit within the authorizing provider's specialty     Patient has had an office visit with the authorizing provider or a provider within the authorizing providers department within the previous 12 mos or has a future within next 30 days. See \"Patient Info\" tab in inbasket, or \"Choose Columns\" in Meds & Orders section of the refill encounter.              Passed - Medication is active on med list        Passed - Patient is age 18 or older        Passed - Normal serum creatinine on file within past 12 months     Recent Labs   Lab Test 01/17/20  1254   CR 0.70               "

## 2020-07-07 ENCOUNTER — MYC MEDICAL ADVICE (OUTPATIENT)
Dept: FAMILY MEDICINE | Facility: CLINIC | Age: 65
End: 2020-07-07

## 2020-07-07 DIAGNOSIS — E28.39 ESTROGEN DEFICIENCY: ICD-10-CM

## 2020-07-07 DIAGNOSIS — M81.0 OSTEOPOROSIS, UNSPECIFIED OSTEOPOROSIS TYPE, UNSPECIFIED PATHOLOGICAL FRACTURE PRESENCE: Primary | ICD-10-CM

## 2020-07-13 ENCOUNTER — ANCILLARY PROCEDURE (OUTPATIENT)
Dept: BONE DENSITY | Facility: CLINIC | Age: 65
End: 2020-07-13
Attending: FAMILY MEDICINE
Payer: COMMERCIAL

## 2020-07-13 DIAGNOSIS — E28.39 ESTROGEN DEFICIENCY: ICD-10-CM

## 2020-07-13 DIAGNOSIS — M81.0 OSTEOPOROSIS, UNSPECIFIED OSTEOPOROSIS TYPE, UNSPECIFIED PATHOLOGICAL FRACTURE PRESENCE: ICD-10-CM

## 2020-07-13 PROCEDURE — 77080 DXA BONE DENSITY AXIAL: CPT | Performed by: RADIOLOGY

## 2020-08-26 DIAGNOSIS — M81.0 OSTEOPOROSIS, UNSPECIFIED OSTEOPOROSIS TYPE, UNSPECIFIED PATHOLOGICAL FRACTURE PRESENCE: ICD-10-CM

## 2020-08-27 RX ORDER — ALENDRONATE SODIUM 70 MG/1
TABLET ORAL
Qty: 12 TABLET | Refills: 1 | Status: SHIPPED | OUTPATIENT
Start: 2020-08-27 | End: 2021-01-18

## 2020-08-27 NOTE — TELEPHONE ENCOUNTER
Prescription approved per [facility] Refill Protocol.    Kortney Dee RN, [facility] Triage

## 2020-09-02 ENCOUNTER — OFFICE VISIT (OUTPATIENT)
Dept: FAMILY MEDICINE | Facility: CLINIC | Age: 65
End: 2020-09-02
Payer: COMMERCIAL

## 2020-09-02 VITALS
SYSTOLIC BLOOD PRESSURE: 118 MMHG | HEIGHT: 63 IN | DIASTOLIC BLOOD PRESSURE: 80 MMHG | BODY MASS INDEX: 18.07 KG/M2 | HEART RATE: 58 BPM | OXYGEN SATURATION: 99 % | RESPIRATION RATE: 16 BRPM | WEIGHT: 102 LBS | TEMPERATURE: 98.3 F

## 2020-09-02 DIAGNOSIS — Z00.00 ROUTINE GENERAL MEDICAL EXAMINATION AT A HEALTH CARE FACILITY: Primary | ICD-10-CM

## 2020-09-02 DIAGNOSIS — M81.0 OSTEOPOROSIS, UNSPECIFIED OSTEOPOROSIS TYPE, UNSPECIFIED PATHOLOGICAL FRACTURE PRESENCE: ICD-10-CM

## 2020-09-02 PROCEDURE — 90682 RIV4 VACC RECOMBINANT DNA IM: CPT | Performed by: FAMILY MEDICINE

## 2020-09-02 PROCEDURE — 90472 IMMUNIZATION ADMIN EACH ADD: CPT | Performed by: FAMILY MEDICINE

## 2020-09-02 PROCEDURE — 90714 TD VACC NO PRESV 7 YRS+ IM: CPT | Performed by: FAMILY MEDICINE

## 2020-09-02 PROCEDURE — 36415 COLL VENOUS BLD VENIPUNCTURE: CPT | Performed by: FAMILY MEDICINE

## 2020-09-02 PROCEDURE — 82947 ASSAY GLUCOSE BLOOD QUANT: CPT | Performed by: FAMILY MEDICINE

## 2020-09-02 PROCEDURE — 99396 PREV VISIT EST AGE 40-64: CPT | Mod: 25 | Performed by: FAMILY MEDICINE

## 2020-09-02 PROCEDURE — 80061 LIPID PANEL: CPT | Performed by: FAMILY MEDICINE

## 2020-09-02 PROCEDURE — 90471 IMMUNIZATION ADMIN: CPT | Performed by: FAMILY MEDICINE

## 2020-09-02 ASSESSMENT — MIFFLIN-ST. JEOR: SCORE: 982.83

## 2020-09-02 ASSESSMENT — PAIN SCALES - GENERAL: PAINLEVEL: NO PAIN (0)

## 2020-09-02 NOTE — PROGRESS NOTES
SUBJECTIVE:   CC: Rosa Craven is an 65 year old woman who presents for preventive health visit.     Healthy Habits:    Do you get at least three servings of calcium containing foods daily (dairy, green leafy vegetables, etc.)? yes    Amount of exercise or daily activities, outside of work: 7 hour(s) per day    Problems taking medications regularly No    Medication side effects: No    Have you had an eye exam in the past two years? yes    Do you see a dentist twice per year? yes    Do you have sleep apnea, excessive snoring or daytime drowsiness?no    Recurrent uti this winter. Sx's completely resolved.     Started fosamax- about 1.5 years ago. No AE. Reviewed dexa together.     Today's PHQ-2 Score:   PHQ-2 ( 1999 Pf[employer]) 9/2/2020 1/17/2020   Q1: Little interest or pleasure in doing things 0 0   Q2: Feeling down, depressed or hopeless 0 0   PHQ-2 Score 0 0   Q1: Little interest or pleasure in doing things - -   Q2: Feeling down, depressed or hopeless - -   PHQ-2 Score - -       Abuse: Current or Past(Physical, Sexual or Emotional)- No  Do you feel safe in your environment? Yes    Have you ever done Advance Care Planning? (For example, a Health Directive, POLST, or a discussion with a medical provider or your loved ones about your wishes): Yes, patient states has an Advance Care Planning document and will bring a copy to the clinic.  Working on it right now    Social History     Tobacco Use     Smoking status: Never Smoker     Smokeless tobacco: Never Used   Substance Use Topics     Alcohol use: No     If you drink alcohol do you typically have >3 drinks per day or >7 drinks per week? No                     Reviewed orders with patient.  Reviewed health maintenance and updated orders accordingly - Yes  Patient Active Problem List   Diagnosis     CARDIOVASCULAR SCREENING; LDL GOAL LESS THAN 160     Anemia     B12 deficiency anemia     Advanced directives, counseling/discussion     Osteopenia     Osteoporosis,  unspecified osteoporosis type, unspecified pathological fracture presence     Past Surgical History:   Procedure Laterality Date     COLONOSCOPY       ENDOSCOPY UPPER, COLONOSCOPY, COMBINED  12/2/2011    Procedure:COMBINED ENDOSCOPY UPPER, COLONOSCOPY; Colonoscopy with Snare polypectomy, Esophagogastroduodenoscopy with duodenal and gastric biopsy; Surgeon:MONTY WOOD; Location: OR      CAPSULE ENDOSCOPY  12/15/2011    Procedure:CAPSULE/PILL CAM ENDOSCOPY; Surgeon:NEMEA INMAN; Location: GI     wisdom teeth  as teen       Social History     Tobacco Use     Smoking status: Never Smoker     Smokeless tobacco: Never Used   Substance Use Topics     Alcohol use: No     Family History   Problem Relation Age of Onset     Cancer Mother 64        cervical CA     C.A.D. Father 46        MI (perhaps it was a PE as thought came from DVT)- smoked, overweight         Current Outpatient Medications   Medication Sig Dispense Refill     alendronate (FOSAMAX) 70 MG tablet TAKE 1 TABLET BY MOUTH EVERY 7 DAYS 12 tablet 1     Calcium Carb-Cholecalciferol (CALCIUM 600 + D PO)        Multiple Vitamin (MULTIVITAMIN OR) Take 1 tablet by mouth daily.       No Known Allergies    Mammogram Screening: Patient over age 50, mutual decision to screen reflected in health maintenance.    Pertinent mammograms are reviewed under the imaging tab.  History of abnormal Pap smear: NO - age 30-65 PAP every 5 years with negative HPV co-testing recommended  PAP / HPV Latest Ref Rng & Units 3/18/2019 11/12/2015 6/18/2013   PAP - NIL NIL NIL   HPV 16 DNA NEG:Negative Negative - -   HPV 18 DNA NEG:Negative Negative - -   OTHER HR HPV NEG:Negative Negative - -     Reviewed and updated as needed this visit by clinical staff  Tobacco  Allergies  Meds  Med Hx  Surg Hx  Fam Hx  Soc Hx        Reviewed and updated as needed this visit by Provider            ROS:   ROS: 10 point ROS neg other than the symptoms noted above in the  "HPI.    OBJECTIVE:   /80 (BP Location: Left arm, Patient Position: Chair, Cuff Size: Adult Regular)   Pulse 58   Temp 98.3  F (36.8  C) (Oral)   Resp 16   Ht 1.594 m (5' 2.75\")   Wt 46.3 kg (102 lb)   LMP  (Exact Date)   SpO2 99%   Breastfeeding No   BMI 18.21 kg/m    EXAM:  GENERAL APPEARANCE: healthy, alert and no distress  EYES: Eyes grossly normal to inspection, PERRL and conjunctivae and sclerae normal  HENT: ear canals and TM's normal, nose and mouth without ulcers or lesions, oropharynx clear and oral mucous membranes moist  NECK: no adenopathy, no asymmetry, masses, or scars and thyroid normal to palpation  RESP: lungs clear to auscultation - no rales, rhonchi or wheezes  BREAST: normal without masses, tenderness or nipple discharge and no palpable axillary masses or adenopathy  CV: regular rate and rhythm, normal S1 S2, no S3 or S4, no murmur, click or rub, no peripheral edema and peripheral pulses strong  ABDOMEN: soft, nontender, no hepatosplenomegaly, no masses and bowel sounds normal   (female): normal female external genitalia, normal urethral meatus, vaginal mucosal atrophy noted, normal cervix, adnexae, and uterus without masses or abnormal discharge  MS: no musculoskeletal defects are noted and gait is age appropriate without ataxia  SKIN: no suspicious lesions or rashes  NEURO: Normal strength and tone, sensory exam grossly normal, mentation intact and speech normal  PSYCH: mentation appears normal and affect normal/bright      ASSESSMENT/PLAN:   1. Routine general medical examination at a health care facility  Consider vag estrogen if recurrent uti again this year.   Reviewed cv risks and monitoring lipids, lifestyle measures to reduce risk.   - Lipid panel reflex to direct LDL Fasting  - Glucose  - ADMIN 1st VACCINE  - EA ADD'L VACCINE  - FLU VAC, QUADRIVALENT (RIV4) RECOMBINANT DNA, IM    2. Osteoporosis  Continue fosamax. Tolerating medications.     COUNSELING:   Reviewed " "preventive health counseling, as reflected in patient instructions       Regular exercise       Healthy diet/nutrition       Vision screening       Hearing screening       Osteoporosis Prevention/Bone Health       Colon cancer screening       (Heather)menopause management       Advance Care Planning    Estimated body mass index is 18.21 kg/m  as calculated from the following:    Height as of this encounter: 1.594 m (5' 2.75\").    Weight as of this encounter: 46.3 kg (102 lb).        She reports that she has never smoked. She has never used smokeless tobacco.      Counseling Resources:  ATP IV Guidelines  Pooled Cohorts Equation Calculator  Breast Cancer Risk Calculator  BRCA-Related Cancer Risk Assessment: FHS-7 Tool  FRAX Risk Assessment  ICSI Preventive Guidelines  Dietary Guidelines for Americans, 2010  [employer]'s MyPlate  ASA Prophylaxis  Lung CA Screening    Emma Guerra MD  [facility]      The 10-year ASCVD risk score (Serge MATHIAS Jr., et al., 2013) is: 3.4%    Values used to calculate the score:      Age: 65 years      Sex: Female      Is Non- : No      Diabetic: No      Tobacco smoker: No      Systolic Blood Pressure: 118 mmHg      Is BP treated: No      HDL Cholesterol: 92 mg/dL      Total Cholesterol: 253 mg/dL    "

## 2020-09-02 NOTE — PATIENT INSTRUCTIONS
Please call  [facility] in [place] at [phone] to schedule mammogram.     .    Preventive Health Recommendations  Female Ages 50 - 64    Yearly exam: See your health care provider every year in order to  o Review health changes.   o Discuss preventive care.    o Review your medicines if your doctor has prescribed any.      Get a Pap test every three years (unless you have an abnormal result and your provider advises testing more often).    If you get Pap tests with HPV test, you only need to test every 5 years, unless you have an abnormal result.     You do not need a Pap test if your uterus was removed (hysterectomy) and you have not had cancer.    You should be tested each year for STDs (sexually transmitted diseases) if you're at risk.     Have a mammogram every 1 to 2 years.    Have a colonoscopy at age 50, or have a yearly FIT test (stool test). These exams screen for colon cancer.      Have a cholesterol test every 5 years, or more often if advised.    Have a diabetes test (fasting glucose) every three years. If you are at risk for diabetes, you should have this test more often.     If you are at risk for osteoporosis (brittle bone disease), think about having a bone density scan (DEXA).    Shots: Get a flu shot each year. Get a tetanus shot every 10 years.    Nutrition:     Eat at least 5 servings of fruits and vegetables each day.    Eat whole-grain bread, whole-wheat pasta and brown rice instead of white grains and rice.    Get adequate Calcium and Vitamin D.     Lifestyle    Exercise at least 150 minutes a week (30 minutes a day, 5 days a week). This will help you control your weight and prevent disease.    Limit alcohol to one drink per day.    No smoking.     Wear sunscreen to prevent skin cancer.     See your dentist every six months for an exam and cleaning.    See your eye doctor every 1 to 2 years.

## 2020-09-03 LAB
CHOLEST SERPL-MCNC: 260 MG/DL
GLUCOSE SERPL-MCNC: 95 MG/DL (ref 70–99)
HDLC SERPL-MCNC: 91 MG/DL
LDLC SERPL CALC-MCNC: 152 MG/DL
NONHDLC SERPL-MCNC: 169 MG/DL
TRIGL SERPL-MCNC: 84 MG/DL

## 2021-01-07 ENCOUNTER — ANCILLARY PROCEDURE (OUTPATIENT)
Dept: MAMMOGRAPHY | Facility: CLINIC | Age: 66
End: 2021-01-07
Attending: FAMILY MEDICINE
Payer: COMMERCIAL

## 2021-01-07 DIAGNOSIS — Z12.31 SCREENING MAMMOGRAM, ENCOUNTER FOR: ICD-10-CM

## 2021-01-07 PROCEDURE — 77067 SCR MAMMO BI INCL CAD: CPT | Mod: GC | Performed by: STUDENT IN AN ORGANIZED HEALTH CARE EDUCATION/TRAINING PROGRAM

## 2021-01-07 PROCEDURE — 77063 BREAST TOMOSYNTHESIS BI: CPT | Mod: GC | Performed by: STUDENT IN AN ORGANIZED HEALTH CARE EDUCATION/TRAINING PROGRAM

## 2021-01-17 DIAGNOSIS — M81.0 OSTEOPOROSIS, UNSPECIFIED OSTEOPOROSIS TYPE, UNSPECIFIED PATHOLOGICAL FRACTURE PRESENCE: ICD-10-CM

## 2021-01-18 RX ORDER — ALENDRONATE SODIUM 70 MG/1
TABLET ORAL
Qty: 12 TABLET | Refills: 2 | Status: SHIPPED | OUTPATIENT
Start: 2021-01-18 | End: 2021-10-04

## 2021-01-18 NOTE — TELEPHONE ENCOUNTER
Alendronate refill request  To provider to advise; failed RN protocol'    Bisphosphonates [id number]/2021 11:28 AM   Normal serum creatinine on file within past 12 months

## 2021-09-21 NOTE — PROGRESS NOTES
[facility] Dermatology Note  Encounter Date: Sep 22, 2021  Office Visit     Dermatology Problem List:  Last skin check 9/22/21, recommended yearly.  0. NUB - left medial cheek, bx 9/22/21  1. 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 Has a cabin in [place]. Runs 5 miles a day. Spends a lot of time outdoors. Sometimes wears sunscreen. History of tanning bed use before vacations.  Family History: Negative for skin cancer.  ____________________________________________    ASSESSMENT/PLAN:    # Sun damaged skin with solar lentigines. Chronic. Stable.   - Recommended sunscreens SPF #30 or greater, protective clothing and avoidance of tanning beds.     # Benign findings include: seborrheic keratoses, cherry angioma, dermatofibroma. Self limited, minor.   - No further intervention required. Patient to report changes.  - Patient reassured of the benign nature of these lesions.    # Multiple clinically benign nevi. Chronic. Stable.   - No further intervention required. Patient to report changes.  - Patient reassured of the benign nature of these lesions.    # Scaly papule on the right upper cutaneous lip.  - Previously biopsy favored trichilemmoma. No growth. Will continue to monitor.    # Neoplasm of uncertain behavior on the left medial cheek. The differential diagnosis includes BCC vs acneiform papule.  - See shave biopsy procedure note below.    Procedures Performed:   - Shave biopsy procedure note, location: left medial cheek. After discussion of benefits and risks including but not limited to bleeding, infection, scar, incomplete removal, recurrence, and non-diagnostic biopsy, written consent and photographs were obtained. The area was cleaned with isopropyl alcohol. 0.5mL of 1% lidocaine with epinephrine was injected to obtain adequate anesthesia of lesion. Shave biopsy at  site performed. Hemostasis was achieved with aluminium chloride. Petrolatum ointment and a sterile dressing were applied. The patient tolerated the procedure and no complications were noted. The patient was provided with verbal and written post care instructions.     Follow-up: pending path results, 1 year in-person for skin check, or earlier for new or changing lesions.    Staff and Scribe:     Scribe Disclosure:   I, Gisela Alvares, am serving as a scribe to document services personally performed by this physician, Dr. Odalis Pizarro, based on data collection and the provider's statements to me.     Provider Disclosure:   The documentation recorded by the scribe accurately reflects the services I personally performed and the decisions made by me.    Odalis Pizarro MD    Department of Dermatology  [facility]: Phone: [phone], Fax:[phone]  [facility]: Phone: [phone], Fax: [phone]    ____________________________________________    CC: Skin Check (Full skin check. Area of concern-spot left cheek. No personal or family hx of SC)    HPI:  Ms. Rosa Craven is a(n) 66 year old female who presents today as a return patient for skin check.    Last seen 7/9/19. At that time, an inconclusive biopsy of the right upper cutaneous lip was performed (suspected trichilemmoma but biopsy was not fully diagnostic).    Today patient notes concern of a spot on the left cheek. Originally thought this was a pimple. Present for 1 month.    Patient is otherwise feeling well, without additional concerns.    Labs:  Derm path from 7/9/19 reviewed.    SPECIMEN(S):   Shave, right upper cutaneoud lip     FINAL DIAGNOSIS:   Shave, right upper cutaneous lip:   - Mild epidermal hyperplasia with hypergranulosis and focal keratinocyte pallor - (see comment)     COMMENT:   These features are not  entirely diagnostic.  While they suggest the superficial portion of a trichilemmoma, the base of this lesion is not seen.  Thus clinical follow-up is recommended, with repeat sampling if it persists or recurs.    Physical exam:  Vitals: There were no vitals taken for this visit.  SKIN: Total skin excluding the undergarment areas was performed. The exam included the head/face, neck, both arms, chest, back, abdomen, buttocks, both legs, digits and/or nails.   - Foster Type II-III  - Tanned skin on exam.  - Scattered brown macules on sun exposed areas. Darker solar lentigines at the central chest.  - There are dome shaped bright red papules on the trunk.   - Multiple regular brown pigmented macules and papules are identified on the trunk and extremities.  - There are few waxy stuck on tan to brown papules on the trunk.  - Scaly papule on the right upper cutaneous lip.  - Left medial cheek: pink pearly indurated papule.  -There is a firm tan/flesh colored papule that dimples with lateral pressure on the left shin x1.  - No other lesions of concern on areas examined.     Medications:  Current Outpatient Medications   Medication     alendronate (FOSAMAX) 70 MG tablet     Calcium Carb-Cholecalciferol (CALCIUM 600 + D PO)     loratadine (CLARITIN) 10 MG tablet     Multiple Vitamin (MULTIVITAMIN OR)     No current facility-administered medications for this visit.      Past Medical History:   Patient Active Problem List   Diagnosis     CARDIOVASCULAR SCREENING; LDL GOAL LESS THAN 160     Anemia     B12 deficiency anemia     Advanced directives, counseling/discussion     Osteopenia     Osteoporosis, unspecified osteoporosis type, unspecified pathological fracture presence     Past Medical History:   Diagnosis Date     Anemia      NO ACTIVE PROBLEMS      Other dyschromia         CC Referred Self, MD  No address on file on close of this encounter.

## 2021-09-22 ENCOUNTER — OFFICE VISIT (OUTPATIENT)
Dept: DERMATOLOGY | Facility: CLINIC | Age: 66
End: 2021-09-22
Payer: COMMERCIAL

## 2021-09-22 DIAGNOSIS — L82.1 SEBORRHEIC KERATOSIS: ICD-10-CM

## 2021-09-22 DIAGNOSIS — D23.9 DERMATOFIBROMA: ICD-10-CM

## 2021-09-22 DIAGNOSIS — L57.8 SUN-DAMAGED SKIN: ICD-10-CM

## 2021-09-22 DIAGNOSIS — D18.01 CHERRY ANGIOMA: ICD-10-CM

## 2021-09-22 DIAGNOSIS — D48.5 NEOPLASM OF UNCERTAIN BEHAVIOR OF SKIN: Primary | ICD-10-CM

## 2021-09-22 DIAGNOSIS — D22.9 MULTIPLE BENIGN NEVI: ICD-10-CM

## 2021-09-22 DIAGNOSIS — L81.4 SOLAR LENTIGO: ICD-10-CM

## 2021-09-22 PROCEDURE — 88305 TISSUE EXAM BY PATHOLOGIST: CPT | Performed by: DERMATOLOGY

## 2021-09-22 PROCEDURE — 11102 TANGNTL BX SKIN SINGLE LES: CPT | Performed by: DERMATOLOGY

## 2021-09-22 PROCEDURE — 99213 OFFICE O/P EST LOW 20 MIN: CPT | Mod: 25 | Performed by: DERMATOLOGY

## 2021-09-22 RX ORDER — LORATADINE 10 MG/1
10 TABLET ORAL DAILY
COMMUNITY

## 2021-09-22 NOTE — NURSING NOTE
Rosa Craven's goals for this visit include:   Chief Complaint   Patient presents with     Skin Check     Full skin check. Area of concern-spot left cheek. No personal or family hx of SC       She requests these members of her care team be copied on today's visit information:     PCP: Emma Guerra    Referring Provider:  Referred Self, MD  No address on file    There were no vitals taken for this visit.    Do you need any medication refills at today's visit? Blaire Riley on 9/22/2021 at 9:37 AM

## 2021-09-22 NOTE — PATIENT INSTRUCTIONS

## 2021-09-22 NOTE — LETTER
9/22/2021         RE: Rosa Craven  [address] [address]        Dear Colleague,    Thank you for referring your patient, Rosa Craven, to the [facility]. Please see a copy of my visit note below.    [facility] Dermatology Note  Encounter Date: Sep 22, 2021  Office Visit     Dermatology Problem List:  Last skin check 9/22/21, recommended yearly.  0. NUB - left medial cheek, bx 9/22/21  1. 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 Has a cabin in [place]. Runs 5 miles a day. Spends a lot of time outdoors. Sometimes wears sunscreen. History of tanning bed use before vacations.  Family History: Negative for skin cancer.  ____________________________________________    ASSESSMENT/PLAN:    # Sun damaged skin with solar lentigines. Chronic. Stable.   - Recommended sunscreens SPF #30 or greater, protective clothing and avoidance of tanning beds.     # Benign findings include: seborrheic keratoses, cherry angioma, dermatofibroma. Self limited, minor.   - No further intervention required. Patient to report changes.  - Patient reassured of the benign nature of these lesions.    # Multiple clinically benign nevi. Chronic. Stable.   - No further intervention required. Patient to report changes.  - Patient reassured of the benign nature of these lesions.    # Scaly papule on the right upper cutaneous lip.  - Previously biopsy favored trichilemmoma. No growth. Will continue to monitor.    # Neoplasm of uncertain behavior on the left medial cheek. The differential diagnosis includes BCC vs acneiform papule.  - See shave biopsy procedure note below.    Procedures Performed:   - Shave biopsy procedure note, location: left medial cheek. After discussion of benefits and risks including but not limited to bleeding, infection,  scar, incomplete removal, recurrence, and non-diagnostic biopsy, written consent and photographs were obtained. The area was cleaned with isopropyl alcohol. 0.5mL of 1% lidocaine with epinephrine was injected to obtain adequate anesthesia of lesion. Shave biopsy at site performed. Hemostasis was achieved with aluminium chloride. Petrolatum ointment and a sterile dressing were applied. The patient tolerated the procedure and no complications were noted. The patient was provided with verbal and written post care instructions.     Follow-up: pending path results, 1 year in-person for skin check, or earlier for new or changing lesions.    Staff and Scribe:     Scribe Disclosure:   I, Gisela Alvares, am serving as a scribe to document services personally performed by this physician, Dr. Odalis Pizarro, based on data collection and the provider's statements to me.     Provider Disclosure:   The documentation recorded by the scribe accurately reflects the services I personally performed and the decisions made by me.    Odalis Pizarro MD    Department of Dermatology  [facility]: Phone: [phone], Fax:[phone]  [facility]: Phone: [phone], Fax: [phone]    ____________________________________________    CC: Skin Check (Full skin check. Area of concern-spot left cheek. No personal or family hx of SC)    HPI:  Ms. Rosa Craven is a(n) 66 year old female who presents today as a return patient for skin check.    Last seen 7/9/19. At that time, an inconclusive biopsy of the right upper cutaneous lip was performed (suspected trichilemmoma but biopsy was not fully diagnostic).    Today patient notes concern of a spot on the left cheek. Originally thought this was a pimple. Present for 1 month.    Patient is otherwise feeling well, without additional concerns.    Labs:  Derm path from  7/9/19 reviewed.    SPECIMEN(S):   Shave, right upper cutaneoud lip     FINAL DIAGNOSIS:   Shave, right upper cutaneous lip:   - Mild epidermal hyperplasia with hypergranulosis and focal keratinocyte pallor - (see comment)     COMMENT:   These features are not entirely diagnostic.  While they suggest the superficial portion of a trichilemmoma, the base of this lesion is not seen.  Thus clinical follow-up is recommended, with repeat sampling if it persists or recurs.    Physical exam:  Vitals: There were no vitals taken for this visit.  SKIN: Total skin excluding the undergarment areas was performed. The exam included the head/face, neck, both arms, chest, back, abdomen, buttocks, both legs, digits and/or nails.   - Foster Type II-III  - Tanned skin on exam.  - Scattered brown macules on sun exposed areas. Darker solar lentigines at the central chest.  - There are dome shaped bright red papules on the trunk.   - Multiple regular brown pigmented macules and papules are identified on the trunk and extremities.  - There are few waxy stuck on tan to brown papules on the trunk.  - Scaly papule on the right upper cutaneous lip.  - Left medial cheek: pink pearly indurated papule.  -There is a firm tan/flesh colored papule that dimples with lateral pressure on the left shin x1.  - No other lesions of concern on areas examined.     Medications:  Current Outpatient Medications   Medication     alendronate (FOSAMAX) 70 MG tablet     Calcium Carb-Cholecalciferol (CALCIUM 600 + D PO)     loratadine (CLARITIN) 10 MG tablet     Multiple Vitamin (MULTIVITAMIN OR)     No current facility-administered medications for this visit.      Past Medical History:   Patient Active Problem List   Diagnosis     CARDIOVASCULAR SCREENING; LDL GOAL LESS THAN 160     Anemia     B12 deficiency anemia     Advanced directives, counseling/discussion     Osteopenia     Osteoporosis, unspecified osteoporosis type, unspecified pathological fracture  presence     Past Medical History:   Diagnosis Date     Anemia      NO ACTIVE PROBLEMS      Other dyschromia         CC Referred Self, MD  No address on file on close of this encounter.      Again, thank you for allowing me to participate in the care of your patient.        Sincerely,        Odalis Pizarro MD

## 2021-09-22 NOTE — NURSING NOTE
The following medication was given:     MEDICATION:  Lidocaine with epinephrine 1% 1:[id number]  ROUTE: [place]  SITE: see procedure note  DOSE: see procedure note  LOT #: -[id number]  : [employer]osp[employer]  EXPIRATION DATE: 6/1/2022  NDC#: 0479-4207-71     Was there drug waste? Yes  Multi-dose vial: Yes    Maria Del Carmen Ziegler LPN  September 22, 2021

## 2021-09-24 ENCOUNTER — TELEPHONE (OUTPATIENT)
Dept: DERMATOLOGY | Facility: CLINIC | Age: 66
End: 2021-09-24

## 2021-09-24 LAB
PATH REPORT.COMMENTS IMP SPEC: NORMAL
PATH REPORT.COMMENTS IMP SPEC: NORMAL
PATH REPORT.FINAL DX SPEC: NORMAL
PATH REPORT.GROSS SPEC: NORMAL
PATH REPORT.MICROSCOPIC SPEC OTHER STN: NORMAL
PATH REPORT.RELEVANT HX SPEC: NORMAL

## 2021-10-02 DIAGNOSIS — M81.0 OSTEOPOROSIS, UNSPECIFIED OSTEOPOROSIS TYPE, UNSPECIFIED PATHOLOGICAL FRACTURE PRESENCE: ICD-10-CM

## 2021-10-04 RX ORDER — ALENDRONATE SODIUM 70 MG/1
TABLET ORAL
Qty: 12 TABLET | Refills: 0 | Status: SHIPPED | OUTPATIENT
Start: 2021-10-04 | End: 2021-10-18

## 2021-10-04 NOTE — TELEPHONE ENCOUNTER
Routing refill request to provider for review/approval because:  Labs not current      Normal serum creatinine on file within past 12 months        Recent Labs   Lab Test 01/17/20  1254   CR 0.70     Amelia Pisano RN, BSN   [facility]

## 2021-10-05 ENCOUNTER — IMMUNIZATION (OUTPATIENT)
Dept: FAMILY MEDICINE | Facility: OTHER | Age: 66
End: 2021-10-05
Attending: FAMILY MEDICINE

## 2021-10-05 PROCEDURE — 0004A PR COVID VAC PFIZER DIL RECON 30 MCG/0.3 ML IM: CPT

## 2021-10-05 PROCEDURE — 91300 PR COVID VAC PFIZER DIL RECON 30 MCG/0.3 ML IM: CPT

## 2021-10-16 NOTE — PROGRESS NOTES
SUBJECTIVE:   CC: Rosa Craven is an 66 year old woman who presents for preventive health visit.       Patient has been advised of split billing requirements and indicates understanding: Yes  Healthy Habits:     Getting at least 3 servings of Calcium per day:  Yes    Bi-annual eye exam:  Yes    Dental care twice a year:  Yes    Sleep apnea or symptoms of sleep apnea:  None    Diet:  Regular (no restrictions)    Frequency of exercise:  6-7 days/week    Duration of exercise:  45-60 minutes    Taking medications regularly:  Yes    Medication side effects:  None    PHQ-2 Total Score: 0    Additional concerns today:  No    B12- taking MVI and extra calcium. Tolerating bisphosphonate well.     Saw derm recently for skin check and non-healing wound on the face. bx was benign    Eating out less now that live in [place].   Eating more fish and healthier foods since  has high cholesterol.           Today's PHQ-2 Score:   PHQ-2 ( 1999 [employer]) 9/22/2021   Q1: Little interest or pleasure in doing things 0   Q2: Feeling down, depressed or hopeless 0   PHQ-2 Score 0   Q1: Little interest or pleasure in doing things -   Q2: Feeling down, depressed or hopeless -   PHQ-2 Score -       Abuse: Current or Past (Physical, Sexual or Emotional) - No  Do you feel safe in your environment? Yes        Social History     Tobacco Use     Smoking status: Never Smoker     Smokeless tobacco: Never Used   Substance Use Topics     Alcohol use: No     If you drink alcohol do you typically have >3 drinks per day or >7 drinks per week? No    Alcohol Use 7/17/2017   Prescreen: >3 drinks/day or >7 drinks/week? The patient does not drink >3 drinks per day nor >7 drinks per week.   No flowsheet data found.    Reviewed orders with patient.  Reviewed health maintenance and updated orders accordingly - Yes      Breast Cancer Screening:  Any new diagnosis of family breast, ovarian, or bowel cancer? No      Mammogram Screening: Recommended  "annual mammography  Pertinent mammograms are reviewed under the imaging tab.    History of abnormal Pap smear: NO - age 30-65 PAP every 5 years with negative HPV co-testing recommended  PAP / HPV Latest Ref Rng & Units 3/18/2019 11/12/2015 6/18/2013   PAP (Historical) - NIL NIL NIL   HPV16 NEG:Negative Negative - -   HPV18 NEG:Negative Negative - -   HRHPV NEG:Negative Negative - -     Reviewed and updated as needed this visit by clinical staff                 Reviewed and updated as needed this visit by Provider      Review of Systems   Constitutional: Negative for chills and fever.   HENT: Negative for congestion, ear pain, hearing loss and sore throat.    Eyes: Negative for pain and visual disturbance.   Respiratory: Negative for cough and shortness of breath.    Cardiovascular: Negative for chest pain, palpitations and peripheral edema.   Gastrointestinal: Negative for abdominal pain, constipation, diarrhea, heartburn, hematochezia and nausea.   Breasts:  Negative for tenderness, breast mass and discharge.   Genitourinary: Negative for dysuria, frequency, genital sores, hematuria, pelvic pain, urgency, vaginal bleeding and vaginal discharge.   Musculoskeletal: Negative for arthralgias, joint swelling and myalgias.   Skin: Negative for rash.   Neurological: Negative for dizziness, weakness, headaches and paresthesias.   Psychiatric/Behavioral: Negative for mood changes. The patient is not nervous/anxious.           OBJECTIVE:   /79 (BP Location: Right arm, Patient Position: Sitting, Cuff Size: Adult Regular)   Pulse 52   Resp 20   Ht 1.581 m (5' 2.25\")   Wt 45.4 kg (100 lb 1 oz)   SpO2 100%   Breastfeeding No   BMI 18.15 kg/m    Physical Exam  GENERAL APPEARANCE: healthy, alert and no distress  EYES: Eyes grossly normal to inspection, PERRL and conjunctivae and sclerae normal  HENT: ear canals and TM's normal, nose and mouth without ulcers or lesions, oropharynx clear and oral mucous membranes " moist  NECK: no adenopathy, no asymmetry, masses, or scars and thyroid normal to palpation  RESP: lungs clear to auscultation - no rales, rhonchi or wheezes  BREAST: normal without masses, tenderness or nipple discharge and no palpable axillary masses or adenopathy  CV: regular rate and rhythm, normal S1 S2, no S3 or S4, no murmur, click or rub, no peripheral edema and peripheral pulses strong  ABDOMEN: soft, nontender, no hepatosplenomegaly, no masses and bowel sounds normal   (female): normal female external genitalia, normal urethral meatus, vaginal mucosal atrophy noted, normal cervix, adnexae, and uterus without masses or abnormal discharge  MS: no musculoskeletal defects are noted and gait is age appropriate without ataxia  SKIN: no suspicious lesions or rashes  NEURO: Normal strength and tone, sensory exam grossly normal, mentation intact and speech normal  PSYCH: mentation appears normal and affect normal/bright    Diagnostic Test Results:  Labs reviewed in [facility]    The 10-year ASCVD risk score (Serge MATHIAS Jr., et al., 2013) is: 3.9%    Values used to calculate the score:      Age: 66 years      Sex: Female      Is Non- : No      Diabetic: No      Tobacco smoker: No      Systolic Blood Pressure: 117 mmHg      Is BP treated: No      HDL Cholesterol: 91 mg/dL      Total Cholesterol: 260 mg/dL      ASSESSMENT/PLAN:   (Z00.00) Routine general medical examination at a health care facility  (primary encounter diagnosis)      (M81.0) Osteoporosis, unspecified osteoporosis type, unspecified pathological fracture presence  Comment: tolerating fosamax. Has been treating now for 2.5 years  Plan: alendronate (FOSAMAX) 70 MG tablet, DX         Hip/Pelvis/Spine, Basic metabolic panel  (Ca,         Cl, CO2, Creat, Gluc, K, Na, BUN)        Will plan for repeat dexa next summer and stopping med at that time if density stable for drug holiday    (E28.39) Estrogen deficiency  Comment:   Plan: DX  "Hip/Pelvis/Spine            (D51.9) Anemia due to vitamin B12 deficiency, unspecified B12 deficiency type  Comment: supplementing   Plan: Vitamin B12, CBC with platelets and         differential        Will screen labs.     (Z13.220) Screening cholesterol level  Comment:   Plan: Lipid panel reflex to direct LDL Fasting            (Z12.11) Screen for colon cancer  Comment: due for 10 year colonoscopy. Had complicated colonoscopy in past due to tortuous colon. Needs to be completed in operating room with MAC sedation  Plan: Adult Gastro Ref - Procedure Only        Reviewed option of stool testing vs colonoscopy. She wishes to proceed with colonoscopy and will schedule pre-op prior.       COUNSELING:  Reviewed preventive health counseling, as reflected in patient instructions       Regular exercise       Healthy diet/nutrition       Vision screening       Osteoporosis prevention/bone health       Colon cancer screening    Estimated body mass index is 18.21 kg/m  as calculated from the following:    Height as of 9/2/20: 1.594 m (5' 2.75\").    Weight as of 9/2/20: 46.3 kg (102 lb).        She reports that she has never smoked. She has never used smokeless tobacco.      Counseling Resources:  ATP IV Guidelines  Pooled Cohorts Equation Calculator  Breast Cancer Risk Calculator  BRCA-Related Cancer Risk Assessment: FHS-7 Tool  FRAX Risk Assessment  ICSI Preventive Guidelines  Dietary Guidelines for Americans, 2010  USDA's MyPlate  ASA Prophylaxis  Lung CA Screening    Emma Guerra MD  [facility]  "

## 2021-10-16 NOTE — PATIENT INSTRUCTIONS
Please continue alendronate for one more year. If bone  Density scan is stable next year will stop the med for a break.   Please call  [facility] in [place] at [phone] to schedule bone density scan 7/18/22 or later.      Schedule colonoscopy. You will need a pre-op exam 2-3 weeks prior for clearance for the anesthesia    Schedule fasting lab visit.       Preventive Health Recommendations  Female Ages 50 - 64    Yearly exam: See your health care provider every year in order to  o Review health changes.   o Discuss preventive care.    o Review your medicines if your doctor has prescribed any.      Get a Pap test every three years (unless you have an abnormal result and your provider advises testing more often).    If you get Pap tests with HPV test, you only need to test every 5 years, unless you have an abnormal result.     You do not need a Pap test if your uterus was removed (hysterectomy) and you have not had cancer.    You should be tested each year for STDs (sexually transmitted diseases) if you're at risk.     Have a mammogram every 1 to 2 years.    Have a colonoscopy at age 50, or have a yearly FIT test (stool test). These exams screen for colon cancer.      Have a cholesterol test every 5 years, or more often if advised.    Have a diabetes test (fasting glucose) every three years. If you are at risk for diabetes, you should have this test more often.     If you are at risk for osteoporosis (brittle bone disease), think about having a bone density scan (DEXA).    Shots: Get a flu shot each year. Get a tetanus shot every 10 years.    Nutrition:     Eat at least 5 servings of fruits and vegetables each day.    Eat whole-grain bread, whole-wheat pasta and brown rice instead of white grains and rice.    Get adequate Calcium and Vitamin D.     Lifestyle    Exercise at least 150 minutes a week (30 minutes a day, 5 days a week). This will help you control your weight and prevent disease.    Limit alcohol to one  drink per day.    No smoking.     Wear sunscreen to prevent skin cancer.     See your dentist every six months for an exam and cleaning.    See your eye doctor every 1 to 2 years.

## 2021-10-17 ASSESSMENT — ENCOUNTER SYMPTOMS
ARTHRALGIAS: 0
JOINT SWELLING: 0
BREAST MASS: 0
CHILLS: 0
FEVER: 0
MYALGIAS: 0
SORE THROAT: 0
DIARRHEA: 0
HEADACHES: 0
DYSURIA: 0
DIZZINESS: 0
HEARTBURN: 0
CONSTIPATION: 0
HEMATOCHEZIA: 0
WEAKNESS: 0
PARESTHESIAS: 0
PALPITATIONS: 0
EYE PAIN: 0
NAUSEA: 0
HEMATURIA: 0
FREQUENCY: 0
SHORTNESS OF BREATH: 0
NERVOUS/ANXIOUS: 0
ABDOMINAL PAIN: 0
COUGH: 0

## 2021-10-18 ENCOUNTER — OFFICE VISIT (OUTPATIENT)
Dept: FAMILY MEDICINE | Facility: CLINIC | Age: 66
End: 2021-10-18
Payer: COMMERCIAL

## 2021-10-18 VITALS
HEIGHT: 62 IN | OXYGEN SATURATION: 100 % | BODY MASS INDEX: 18.41 KG/M2 | WEIGHT: 100.06 LBS | RESPIRATION RATE: 20 BRPM | DIASTOLIC BLOOD PRESSURE: 79 MMHG | HEART RATE: 52 BPM | SYSTOLIC BLOOD PRESSURE: 117 MMHG

## 2021-10-18 DIAGNOSIS — Z12.11 SCREEN FOR COLON CANCER: ICD-10-CM

## 2021-10-18 DIAGNOSIS — E28.39 ESTROGEN DEFICIENCY: ICD-10-CM

## 2021-10-18 DIAGNOSIS — Z13.220 SCREENING CHOLESTEROL LEVEL: ICD-10-CM

## 2021-10-18 DIAGNOSIS — Z00.00 ROUTINE GENERAL MEDICAL EXAMINATION AT A HEALTH CARE FACILITY: Primary | ICD-10-CM

## 2021-10-18 DIAGNOSIS — D51.9 ANEMIA DUE TO VITAMIN B12 DEFICIENCY, UNSPECIFIED B12 DEFICIENCY TYPE: ICD-10-CM

## 2021-10-18 DIAGNOSIS — M81.0 OSTEOPOROSIS, UNSPECIFIED OSTEOPOROSIS TYPE, UNSPECIFIED PATHOLOGICAL FRACTURE PRESENCE: ICD-10-CM

## 2021-10-18 PROCEDURE — 90682 RIV4 VACC RECOMBINANT DNA IM: CPT | Performed by: FAMILY MEDICINE

## 2021-10-18 PROCEDURE — 99396 PREV VISIT EST AGE 40-64: CPT | Mod: 25 | Performed by: FAMILY MEDICINE

## 2021-10-18 PROCEDURE — 90471 IMMUNIZATION ADMIN: CPT | Performed by: FAMILY MEDICINE

## 2021-10-18 RX ORDER — ALENDRONATE SODIUM 70 MG/1
TABLET ORAL
Qty: 12 TABLET | Refills: 3 | Status: SHIPPED | OUTPATIENT
Start: 2021-10-18 | End: 2022-11-28

## 2021-10-18 ASSESSMENT — ENCOUNTER SYMPTOMS
PALPITATIONS: 0
SHORTNESS OF BREATH: 0
SORE THROAT: 0
HEARTBURN: 0
CONSTIPATION: 0
ABDOMINAL PAIN: 0
JOINT SWELLING: 0
MYALGIAS: 0
BREAST MASS: 0
HEADACHES: 0
EYE PAIN: 0
HEMATURIA: 0
COUGH: 0
DIZZINESS: 0
FEVER: 0
NAUSEA: 0
ARTHRALGIAS: 0
PARESTHESIAS: 0
CHILLS: 0
FREQUENCY: 0
NERVOUS/ANXIOUS: 0
DYSURIA: 0
DIARRHEA: 0
HEMATOCHEZIA: 0
WEAKNESS: 0

## 2021-10-18 ASSESSMENT — MIFFLIN-ST. JEOR: SCORE: 961.1

## 2021-10-22 ENCOUNTER — LAB (OUTPATIENT)
Dept: LAB | Facility: OTHER | Age: 66
End: 2021-10-22
Attending: FAMILY MEDICINE
Payer: COMMERCIAL

## 2021-10-22 DIAGNOSIS — D51.9 ANEMIA DUE TO VITAMIN B12 DEFICIENCY, UNSPECIFIED B12 DEFICIENCY TYPE: ICD-10-CM

## 2021-10-22 DIAGNOSIS — M81.0 OSTEOPOROSIS, UNSPECIFIED OSTEOPOROSIS TYPE, UNSPECIFIED PATHOLOGICAL FRACTURE PRESENCE: ICD-10-CM

## 2021-10-22 DIAGNOSIS — Z13.220 SCREENING CHOLESTEROL LEVEL: ICD-10-CM

## 2021-10-22 LAB
ANION GAP SERPL CALCULATED.3IONS-SCNC: 7 MMOL/L (ref 3–14)
BASOPHILS # BLD AUTO: 0.1 10E3/UL (ref 0–0.2)
BASOPHILS NFR BLD AUTO: 1 %
BUN SERPL-MCNC: 21 MG/DL (ref 7–25)
CALCIUM SERPL-MCNC: 9.3 MG/DL (ref 8.6–10.3)
CHLORIDE BLD-SCNC: 105 MMOL/L (ref 98–107)
CHOLEST SERPL-MCNC: 276 MG/DL
CO2 SERPL-SCNC: 30 MMOL/L (ref 21–31)
CREAT SERPL-MCNC: 0.75 MG/DL (ref 0.6–1.2)
EOSINOPHIL # BLD AUTO: 0.1 10E3/UL (ref 0–0.7)
EOSINOPHIL NFR BLD AUTO: 3 %
ERYTHROCYTE [DISTWIDTH] IN BLOOD BY AUTOMATED COUNT: 12.7 % (ref 10–15)
FASTING STATUS PATIENT QL REPORTED: ABNORMAL
GFR SERPL CREATININE-BSD FRML MDRD: 85 ML/MIN/1.73M2
GLUCOSE BLD-MCNC: 98 MG/DL (ref 70–105)
HCT VFR BLD AUTO: 42.9 % (ref 35–47)
HDLC SERPL-MCNC: 85 MG/DL
HGB BLD-MCNC: 14 G/DL (ref 11.7–15.7)
IMM GRANULOCYTES # BLD: 0 10E3/UL
IMM GRANULOCYTES NFR BLD: 0 %
LDLC SERPL CALC-MCNC: 170 MG/DL
LYMPHOCYTES # BLD AUTO: 1.5 10E3/UL (ref 0.8–5.3)
LYMPHOCYTES NFR BLD AUTO: 33 %
MCH RBC QN AUTO: 30.2 PG (ref 26.5–33)
MCHC RBC AUTO-ENTMCNC: 32.6 G/DL (ref 31.5–36.5)
MCV RBC AUTO: 93 FL (ref 78–100)
MONOCYTES # BLD AUTO: 0.3 10E3/UL (ref 0–1.3)
MONOCYTES NFR BLD AUTO: 8 %
NEUTROPHILS # BLD AUTO: 2.5 10E3/UL (ref 1.6–8.3)
NEUTROPHILS NFR BLD AUTO: 55 %
NONHDLC SERPL-MCNC: 191 MG/DL
NRBC # BLD AUTO: 0 10E3/UL
NRBC BLD AUTO-RTO: 0 /100
PLATELET # BLD AUTO: 226 10E3/UL (ref 150–450)
POTASSIUM BLD-SCNC: 4 MMOL/L (ref 3.5–5.1)
RBC # BLD AUTO: 4.63 10E6/UL (ref 3.8–5.2)
SODIUM SERPL-SCNC: 142 MMOL/L (ref 134–144)
TRIGL SERPL-MCNC: 107 MG/DL
VIT B12 SERPL-MCNC: 438 PG/ML (ref 180–914)
WBC # BLD AUTO: 4.5 10E3/UL (ref 4–11)

## 2021-10-22 PROCEDURE — 82607 VITAMIN B-12: CPT | Mod: ZL

## 2021-10-22 PROCEDURE — 80048 BASIC METABOLIC PNL TOTAL CA: CPT | Mod: ZL

## 2021-10-22 PROCEDURE — 80061 LIPID PANEL: CPT | Mod: ZL

## 2021-10-22 PROCEDURE — 85025 COMPLETE CBC W/AUTO DIFF WBC: CPT | Mod: ZL

## 2021-10-22 PROCEDURE — 36415 COLL VENOUS BLD VENIPUNCTURE: CPT | Mod: ZL

## 2021-10-23 ENCOUNTER — MYC MEDICAL ADVICE (OUTPATIENT)
Dept: FAMILY MEDICINE | Facility: CLINIC | Age: 66
End: 2021-10-23

## 2021-10-25 NOTE — RESULT ENCOUNTER NOTE
Rosa,  It was a pleasure to see you in the office recently.   - complete blood count, vitamin B12 and kidney and electrolyte panel were all normal.   - The fasting blood glucose (diabetes screening test) was negative/normal.   - Unfortunately, the cholesterol is creeping up. Thankfully, given your healthy lifestyle, your cardiovascular risk score is still < 7.5% (see below) which is the cut off where a cholesterol med is highly recommended. However, we can start treatment sooner if desired with your dad's possible heart disease history if would prefer. Let me know your preferred approach.   Lifestyle recommendations to reduce cholesterol and cardiovascular risks:  Diet:  -Try to eat more vegetables, fruits, legumes, nuts/seeds, whole grains, and fish.  - try to eat less red meat, processed meats, processed foods, sweetened beverages.   - try to replace saturated fat in your diet with mono- and poly-unsaturated fats   Exercise:  Aim for regular exercise with a goal of 150 min of moderate to high intensity aerobic exercise per week    Please MyChart or call if you have any concerns or questions.   Sincerely,  Emma Guerra MD    The 10-year ASCVD risk score (Sergethuy MATHIAS Jr., et al., 2013) is: 4.1%    Values used to calculate the score:      Age: 66 years      Sex: Female      Is Non- : No      Diabetic: No      Tobacco smoker: No      Systolic Blood Pressure: 117 mmHg      Is BP treated: No      HDL Cholesterol: 85 mg/dL      Total Cholesterol: 276 mg/dL

## 2021-10-26 ENCOUNTER — TELEPHONE (OUTPATIENT)
Dept: GASTROENTEROLOGY | Facility: CLINIC | Age: 66
End: 2021-10-26

## 2021-10-26 ENCOUNTER — MYC MEDICAL ADVICE (OUTPATIENT)
Dept: FAMILY MEDICINE | Facility: CLINIC | Age: 66
End: 2021-10-26

## 2021-10-26 ENCOUNTER — HOSPITAL ENCOUNTER (OUTPATIENT)
Facility: AMBULATORY SURGERY CENTER | Age: 66
End: 2021-10-26
Attending: INTERNAL MEDICINE
Payer: COMMERCIAL

## 2021-10-26 ENCOUNTER — MYC MEDICAL ADVICE (OUTPATIENT)
Dept: FAMILY MEDICINE | Facility: CLINIC | Age: 66
End: 2021-10-26
Payer: COMMERCIAL

## 2021-10-26 DIAGNOSIS — E78.5 HYPERLIPIDEMIA LDL GOAL <160: Primary | ICD-10-CM

## 2021-10-26 NOTE — TELEPHONE ENCOUNTER
Procedure: Lower Endoscopy    Lower Endoscopy Type: Colonoscopy    Purpose of Colonoscopy Procedure: Screening    Colonoscopy Sedation: Deep/MAC    Preferred Location: [facility]    Scheduling Instructions: If you have not heard from the scheduling office within 2 business days, please call [phone].      Appears to have been ordered correctly with deep sedation/MAC    Reviewed last colonoscopy recs:   - Repeat colonoscopy in 5-10 years for surveillance                             depending on pathology results and clinical course                             (will need to be in the OR)       Endoscopy team: is patient scheduled for OR and deep/MAC sedation given the tortuous colon history and past GI recs? See patient's mychart message.

## 2021-10-28 NOTE — TELEPHONE ENCOUNTER
Please call gastroenterologist nurse at [facility] and ask if preop is needed since MAC or deep sedation

## 2021-11-08 NOTE — TELEPHONE ENCOUNTER
I called them back again and they said she will not need a pre op even with deep MAC because she has doesn't have any issues or no red flags have popped up in the answer to the questions.  Please let me know if you need me to check on anything else   Odalis    Message text      You  [facility] 7 days ago     SADE Jacobo,   Were you able to talk with a nurse at the GI group? Usually deep sedation/MAC anesthesia requires pre-op so want to double check we are telling the patient the right thing   Falguni Vizcaino comment

## 2021-12-05 DIAGNOSIS — Z11.59 ENCOUNTER FOR SCREENING FOR OTHER VIRAL DISEASES: ICD-10-CM

## 2021-12-21 ENCOUNTER — TELEPHONE (OUTPATIENT)
Dept: GASTROENTEROLOGY | Facility: CLINIC | Age: 66
End: 2021-12-21

## 2021-12-21 NOTE — TELEPHONE ENCOUNTER
"Patient scheduled for colonoscopy on 1/3/22 at [facility] under conscious sedation.     Per order it should be MAC.     However, upon review in previous colonoscopy report from Dr. Waller on 12/2/11:  \" Torotuous colon - Repeat colonoscopy in 5-10 years for surveillance depending on pathology results and clinical course (will need to be in the OR)\"     2008 colonoscopy was incomplete due to Torotuous colon     Staff message sent to GI provider to clarify if patient should be scheduled in OR or ok to proceed at [facility]. If ok to proceed will reach out to endoscopy scheduling to change sedation to MAC.     Colleen Mcgill RN    "

## 2021-12-22 NOTE — TELEPHONE ENCOUNTER
Looped in endoscopy scheduling lead to staff message to see if Dr. Andres would need to review for OR.     Colleen Mcgill RN

## 2021-12-24 ENCOUNTER — TELEPHONE (OUTPATIENT)
Dept: GASTROENTEROLOGY | Facility: CLINIC | Age: 66
End: 2021-12-24
Payer: COMMERCIAL

## 2021-12-24 NOTE — TELEPHONE ENCOUNTER
Caller: Emelia    Procedure: Colon    Date, Location, and Surgeon of Procedure Cancelled: 1/3/22 LUKAS Mims will need to be canceled, and she will need to be rescheduled at the [facility] with MAC.  She will also need a pre op scheduled    Ordering Provider:Mari    Reason for cancel (please be detailed, any staff messages or encounters to note?): Patient needs to be rescheduled at the [facility] with MAC sedation, will need pre op as well  Left message for patient to call us back to reschedule        Rescheduled: No-message left for patient to call us back to reschedule     If rescheduled:    Date:    Location:    Note any change or update to original order/sedation:

## 2021-12-24 NOTE — TELEPHONE ENCOUNTER
"Per Dr. Andres \" [facility] would be fine with MAC/Deep.\"    Endoscopy Scheduling team is aware and will r/s pt's procedure.    Janell Mclaughlin RN        "

## 2022-01-03 ENCOUNTER — HOSPITAL ENCOUNTER (OUTPATIENT)
Facility: CLINIC | Age: 67
End: 2022-01-03
Attending: INTERNAL MEDICINE | Admitting: INTERNAL MEDICINE
Payer: COMMERCIAL

## 2022-01-12 ENCOUNTER — MYC MEDICAL ADVICE (OUTPATIENT)
Dept: FAMILY MEDICINE | Facility: CLINIC | Age: 67
End: 2022-01-12
Payer: COMMERCIAL

## 2022-01-12 DIAGNOSIS — Z12.11 SCREEN FOR COLON CANCER: Primary | ICD-10-CM

## 2022-01-19 ENCOUNTER — TELEPHONE (OUTPATIENT)
Dept: GASTROENTEROLOGY | Facility: CLINIC | Age: 67
End: 2022-01-19
Payer: COMMERCIAL

## 2022-01-19 NOTE — TELEPHONE ENCOUNTER
Caller: CANDIDO    Procedure: COLON    Date, Location, and Surgeon of Procedure Cancelled: 2/10, [facility], DAVID    Ordering Provider:    Reason for cancel (please be detailed, any staff messages or encounters to note?): PATIENT GOING TO TRY COLOGUARD FIRST, TALKED IT OVER WITH PRIMARY         Rescheduled: NO     If rescheduled:    Date:    Location:    Note any change or update to original order/sedation:

## 2022-01-24 LAB — COLOGUARD-ABSTRACT: NEGATIVE

## 2022-02-02 NOTE — RESULT ENCOUNTER NOTE
Rosa,  Your Cologuard test was negative.   We will plan to repeat this screening test every three years.  DEBBIE Mcgill M.D.

## 2022-04-18 ENCOUNTER — MYC MEDICAL ADVICE (OUTPATIENT)
Dept: FAMILY MEDICINE | Facility: CLINIC | Age: 67
End: 2022-04-18
Payer: COMMERCIAL

## 2022-05-24 ENCOUNTER — ALLIED HEALTH/NURSE VISIT (OUTPATIENT)
Dept: FAMILY MEDICINE | Facility: OTHER | Age: 67
End: 2022-05-24
Attending: FAMILY MEDICINE

## 2022-05-24 DIAGNOSIS — Z23 NEED FOR VACCINATION: Primary | ICD-10-CM

## 2022-05-24 PROCEDURE — 91305 COVID-19,PF,PFIZER (12+ YRS): CPT

## 2022-05-24 PROCEDURE — 0054A COVID-19,PF,PFIZER (12+ YRS): CPT

## 2022-05-24 NOTE — PROGRESS NOTES
Immunization: Adult   Verified patient's name and . Patient stated reason for visit today is to receive COVID booster vaccine(s). Denied any concerns with previous immunizations. Allergies reviewed.  Screening Questionnaire for Adult Immunization completed (see below). VIS handout(s) reviewed and given to patient to take home. COVID booster prepared and administered IM per standing order. Administration documented in IMMUNIZATIONS (see MIIC and order for further information). Instructed to wait in lobby for 15 minutes post-injection and notify RN immediately of any adverse reaction.         Screening Questionnaire for Adult Immunization      Is the pt sick today?    no    Does the pt have allergies to vaccines or vaccine components?   no    Has the pt had a serious reaction to a vaccine in the past?  no     Has the pt received any vaccinations in the past 4 weeks?   no      Immunization questionnaire answers were all negative.     Henrry Arevalo RN, 2022 2:28 PM

## 2022-07-11 ENCOUNTER — ANCILLARY PROCEDURE (OUTPATIENT)
Dept: MAMMOGRAPHY | Facility: CLINIC | Age: 67
End: 2022-07-11
Attending: FAMILY MEDICINE
Payer: COMMERCIAL

## 2022-07-11 ENCOUNTER — ANCILLARY PROCEDURE (OUTPATIENT)
Dept: BONE DENSITY | Facility: CLINIC | Age: 67
End: 2022-07-11
Attending: FAMILY MEDICINE
Payer: COMMERCIAL

## 2022-07-11 DIAGNOSIS — M81.0 OSTEOPOROSIS, UNSPECIFIED OSTEOPOROSIS TYPE, UNSPECIFIED PATHOLOGICAL FRACTURE PRESENCE: ICD-10-CM

## 2022-07-11 DIAGNOSIS — E28.39 ESTROGEN DEFICIENCY: ICD-10-CM

## 2022-07-11 DIAGNOSIS — Z12.31 VISIT FOR SCREENING MAMMOGRAM: ICD-10-CM

## 2022-07-11 PROCEDURE — 77080 DXA BONE DENSITY AXIAL: CPT | Performed by: RADIOLOGY

## 2022-07-11 PROCEDURE — 77063 BREAST TOMOSYNTHESIS BI: CPT | Performed by: RADIOLOGY

## 2022-07-11 PROCEDURE — 77067 SCR MAMMO BI INCL CAD: CPT | Performed by: RADIOLOGY

## 2022-07-12 NOTE — RESULT ENCOUNTER NOTE
Rosa,    Your bone scan shows osteopenia which is stable to slightly improved in the lumbar spine.  Hip bone density has gone down almost 3% since last check.     Given the drop in hip bone density, even though it is small, I would suggest continuing on the Fosamax for another few years provided you are tolerating the medication.    Continue to take in adequate amounts of Calcium and Vitamin D. These are the building blocks for bones. Most women will need 1,200 mg of Calcium (through diet and supplements if needed) and 1,000 international units of Vitamin D daily (typically through supplements).    Exercise daily to keep your bones strong. Thirty minutes of moderate walking or other aerobic activity is recommended.    If you have any questions or concerns, please mychart or call.     Sincerely,      Emma Guerra M.D.

## 2022-10-10 ENCOUNTER — HEALTH MAINTENANCE LETTER (OUTPATIENT)
Age: 67
End: 2022-10-10

## 2022-10-19 ENCOUNTER — LAB (OUTPATIENT)
Dept: LAB | Facility: OTHER | Age: 67
End: 2022-10-19
Attending: FAMILY MEDICINE
Payer: MEDICARE

## 2022-10-19 DIAGNOSIS — E78.5 HYPERLIPIDEMIA LDL GOAL <160: ICD-10-CM

## 2022-10-19 LAB
CHOLEST SERPL-MCNC: 267 MG/DL
HDLC SERPL-MCNC: 92 MG/DL
LDLC SERPL CALC-MCNC: 154 MG/DL
NONHDLC SERPL-MCNC: 175 MG/DL
TRIGL SERPL-MCNC: 104 MG/DL

## 2022-10-19 PROCEDURE — 80061 LIPID PANEL: CPT | Mod: ZL

## 2022-10-19 PROCEDURE — 36415 COLL VENOUS BLD VENIPUNCTURE: CPT | Mod: ZL

## 2022-10-20 NOTE — RESULT ENCOUNTER NOTE
Rosa,  Nice job on dropping the LDL (bad cholesterol) somewhat. Keep up the good work.   Please MyChart or call if you have any concerns or questions.   Sincerely,  Emma Guerra MD

## 2022-11-13 ENCOUNTER — E-VISIT (OUTPATIENT)
Dept: URGENT CARE | Facility: CLINIC | Age: 67
End: 2022-11-13
Payer: MEDICARE

## 2022-11-13 DIAGNOSIS — J06.9 UPPER RESPIRATORY TRACT INFECTION, UNSPECIFIED TYPE: Primary | ICD-10-CM

## 2022-11-13 PROCEDURE — 99421 OL DIG E/M SVC 5-10 MIN: CPT | Performed by: INTERNAL MEDICINE

## 2022-11-13 NOTE — PATIENT INSTRUCTIONS
The symptoms you describe suggest a viral cause, which is much more common than a bacterial cause. Antibiotics will treat bacterial infections, but have no effect on viral infections. If possible, especially if improving, start with symptom care for the first 7-10 days, then consider seeking further treatment or taking an antibiotic. Bacterial infections generally are more severe, including symptoms such as pus, fever over 101degrees F, or rapidly worsening.  You may want to try a nasal lavage (also known as nasal irrigation). You can find over-the-counter products, such as Neti-Pot, at retail locations or make your own at home. Instructions for homemade nasal lavage and more information on the process are available online at http://www.aafp.org/afp/2009/1115/p1121.html.    Dear Rosa,      Currently your symptoms are most consistent with a viral infection.  Antibiotics are not indicated for viruses.  Also, you may have coronavirus (COVID-19) or influenza and testing has been ordered for those. If your symptoms do not improve within 5 days, follow up with your doctor.    Will I be tested for influenza and COVID-19?  We would like to test you for influenza and COVID. I have placed orders for these tests.     To schedule: go to your [employer] home page and scroll down to the section that says  You have an appointment that needs to be scheduled  and click the large green button that says  Schedule Now  and follow the steps to find the next available openings.    If you are unable to complete these [employer] scheduling steps, please call [phone] to schedule your testing.     These guidelines are for isolating before returning to work, school or .     For employers, schools and day cares: This is an official notice for this person s medical guidelines for returning in-person.     For health care sites: The CDC gives different isolation and quarantine guidelines for healthcare sites, please check with these  sites before arriving.     How do I self-isolate?  You isolate when you have symptoms of COVID or a test shows you have COVID, even if you don t have symptoms.     If you DO have symptoms:  o Stay home and away from others  - For at least 5 days after your symptoms started, AND   - You are fever free for 24 hours (with no medicine that reduces fever), AND  - Your other symptoms are better.  o Wear a mask for 10 full days any time you are around others.    If you DON T have symptoms:  o Stay at home and away from others for at least 5 days after your positive test.  o Wear a mask for 10 full days any time you are around others.    How can I take care of myself?  Over the counter medications may help with your symptoms such as runny or stuffy nose, cough, chills, or fever.  Talk to your care team about your options.     Some people are at high risk of severe illness (for example, you have a weak immune system, you re 65 years or older, or you have certain medical problems). If your risk is high and your symptoms started in the last 5 to 7 days, we strongly recommend for you to get COVID treatment as soon as possible. Paxlovid, Molnupiravir and the monoclonal antibody treatments are proven safe and effective, make you feel better faster, and prevent hospitalization and death.       To schedule an appointment to discuss COVID treatment, request an appointment on [facility] (select  COVID-19 Treatment ) or call [email or web address] ([phone]), press 7.      Get lots of rest. Drink extra fluids (unless a doctor has told you not to)    Take Tylenol (acetaminophen) or ibuprofen for fever or pain. If you have liver or kidney problems, ask your family doctor if it's okay to take Tylenol or ibuprofen    Take over the counter medications for your symptoms, as directed by your doctor. You may also talk to your pharmacist.      If you have other health problems (like cancer, heart failure, an organ transplant or severe kidney  disease): Call your specialty clinic if you don't feel better in the next 2 days.    Know when to call 911. Emergency warning signs include:  o Trouble breathing or shortness of breath  o Pain or pressure in the chest that doesn't go away  o Feeling confused like you haven't felt before, or not being able to wake up  o Bluish-colored lips or face    Where can I get more information?    [facility] - About COVID-19: [email or web address]     CDC - What to Do If You're Sick: https://www.cdc.gov/coronavirus/2019-ncov/if-you-are-sick/index.html     CDC - Quarantine & Isolation: https://www.cdc.gov/coronavirus/2019-ncov/your-health/quarantine-isolation.html     [facility] clinical trials (COVID-19 research studies): clinicalaffairs.[facility]-clinical-trials    Below are the COVID-19 hotlines at the [facility] ([facility]). Interpreters are available.  o For health questions: Call [phone] or [phone] (7 a.m. to 7 p.m.)  o For questions about schools and childcare: Call [phone] or [phone] (7 a.m. to 7 p.m.)

## 2022-11-14 ENCOUNTER — ALLIED HEALTH/NURSE VISIT (OUTPATIENT)
Dept: FAMILY MEDICINE | Facility: OTHER | Age: 67
End: 2022-11-14
Attending: INTERNAL MEDICINE
Payer: MEDICARE

## 2022-11-14 DIAGNOSIS — J06.9 UPPER RESPIRATORY TRACT INFECTION, UNSPECIFIED TYPE: ICD-10-CM

## 2022-11-14 LAB
FLUAV RNA SPEC QL NAA+PROBE: NEGATIVE
FLUBV RNA RESP QL NAA+PROBE: NEGATIVE
RSV RNA SPEC NAA+PROBE: NEGATIVE
SARS-COV-2 RNA RESP QL NAA+PROBE: NEGATIVE

## 2022-11-14 PROCEDURE — 87637 SARSCOV2&INF A&B&RSV AMP PRB: CPT | Mod: ZL

## 2022-11-14 PROCEDURE — C9803 HOPD COVID-19 SPEC COLLECT: HCPCS

## 2022-11-14 NOTE — NURSING NOTE
Chief Complaint   Patient presents with     Covid 19 Testing       Patient swabbed for COVID-19 testing.  Yaquelin Faust LPN on 11/14/2022 at 11:54 AM

## 2023-03-13 ASSESSMENT — ENCOUNTER SYMPTOMS
ARTHRALGIAS: 0
JOINT SWELLING: 0
NERVOUS/ANXIOUS: 0
FEVER: 0
BREAST MASS: 0
COUGH: 0
HEMATOCHEZIA: 0
SORE THROAT: 0
FREQUENCY: 0
DIZZINESS: 0
WEAKNESS: 0
PARESTHESIAS: 0
HEMATURIA: 0
CHILLS: 0
HEARTBURN: 0
EYE PAIN: 0
PALPITATIONS: 0
ABDOMINAL PAIN: 0
MYALGIAS: 0
CONSTIPATION: 0
DIARRHEA: 0
DYSURIA: 0
NAUSEA: 0
HEADACHES: 0
SHORTNESS OF BREATH: 0

## 2023-03-13 ASSESSMENT — ACTIVITIES OF DAILY LIVING (ADL): CURRENT_FUNCTION: NO ASSISTANCE NEEDED

## 2023-03-16 ENCOUNTER — OFFICE VISIT (OUTPATIENT)
Dept: FAMILY MEDICINE | Facility: CLINIC | Age: 68
End: 2023-03-16
Payer: MEDICARE

## 2023-03-16 VITALS
BODY MASS INDEX: 18.26 KG/M2 | HEIGHT: 62 IN | TEMPERATURE: 98 F | OXYGEN SATURATION: 99 % | HEART RATE: 67 BPM | WEIGHT: 99.2 LBS | SYSTOLIC BLOOD PRESSURE: 123 MMHG | RESPIRATION RATE: 16 BRPM | DIASTOLIC BLOOD PRESSURE: 77 MMHG

## 2023-03-16 DIAGNOSIS — M81.0 OSTEOPOROSIS, UNSPECIFIED OSTEOPOROSIS TYPE, UNSPECIFIED PATHOLOGICAL FRACTURE PRESENCE: ICD-10-CM

## 2023-03-16 DIAGNOSIS — E78.5 HYPERLIPIDEMIA LDL GOAL <160: ICD-10-CM

## 2023-03-16 DIAGNOSIS — D51.9 ANEMIA DUE TO VITAMIN B12 DEFICIENCY, UNSPECIFIED B12 DEFICIENCY TYPE: ICD-10-CM

## 2023-03-16 DIAGNOSIS — Z00.00 ENCOUNTER FOR MEDICARE ANNUAL WELLNESS EXAM: Primary | ICD-10-CM

## 2023-03-16 PROCEDURE — G0009 ADMIN PNEUMOCOCCAL VACCINE: HCPCS | Performed by: FAMILY MEDICINE

## 2023-03-16 PROCEDURE — G0402 INITIAL PREVENTIVE EXAM: HCPCS | Performed by: FAMILY MEDICINE

## 2023-03-16 PROCEDURE — 90677 PCV20 VACCINE IM: CPT | Performed by: FAMILY MEDICINE

## 2023-03-16 ASSESSMENT — ENCOUNTER SYMPTOMS
SHORTNESS OF BREATH: 0
COUGH: 0
DYSURIA: 0
ARTHRALGIAS: 0
HEARTBURN: 0
JOINT SWELLING: 0
PALPITATIONS: 0
HEMATURIA: 0
DIARRHEA: 0
WEAKNESS: 0
FREQUENCY: 0
EYE PAIN: 0
BREAST MASS: 0
NERVOUS/ANXIOUS: 0
CHILLS: 0
ABDOMINAL PAIN: 0
HEADACHES: 0
HEMATOCHEZIA: 0
CONSTIPATION: 0
FEVER: 0
SORE THROAT: 0
PARESTHESIAS: 0
MYALGIAS: 0
DIZZINESS: 0
NAUSEA: 0

## 2023-03-16 ASSESSMENT — ACTIVITIES OF DAILY LIVING (ADL): CURRENT_FUNCTION: NO ASSISTANCE NEEDED

## 2023-03-16 ASSESSMENT — PAIN SCALES - GENERAL: PAINLEVEL: NO PAIN (0)

## 2023-03-16 NOTE — PROGRESS NOTES
"SUBJECTIVE:   Rosa is a 67 year old who presents for Preventive Visit.  Patient has been advised of split billing requirements and indicates understanding: Yes  Are you in the first 12 months of your Medicare coverage?  Yes,  Visual Acuity:  Right Eye: 20/25   Left Eye: 20/25  Both Eyes: 20/20-1    Healthy Habits:     In general, how would you rate your overall health?  Good    Frequency of exercise:  6-7 days/week    Duration of exercise:  30-45 minutes    Do you usually eat at least 4 servings of fruit and vegetables a day, include whole grains    & fiber and avoid regularly eating high fat or \"junk\" foods?  Yes    Taking medications regularly:  Yes    Medication side effects:  Not applicable    Ability to successfully perform activities of daily living:  No assistance needed    Home Safety:  No safety concerns identified    Hearing Impairment:  No hearing concerns    In the past 6 months, have you been bothered by leaking of urine?  No    In general, how would you rate your overall mental or emotional health?  Good      PHQ-2 Total Score: 0    Additional concerns today:  No      Have you ever done Advance Care Planning? (For example, a Health Directive, POLST, or a discussion with a medical provider or your loved ones about your wishes): No, advance care planning information given to patient to review.  Patient plans to discuss their wishes with loved ones or provider.        Right Ear:      1000 Hz RESPONSE- on Level: 40 db (Conditioning sound)   1000 Hz: RESPONSE- on Level: tone not heard   2000 Hz: RESPONSE- on Level:   20 db    4000 Hz: RESPONSE- on Level:   20 db     Left Ear:      4000 Hz: RESPONSE- on Level: tone not heard   2000 Hz: RESPONSE- on Level:   20 db    1000 Hz: RESPONSE- on Level:   20 db     500 Hz: RESPONSE- on Level: tone not heard    Right Ear:    500 Hz: RESPONSE- on Level: tone not heard    Hearing test repeated at the end of the visit- see nursing note. passed    Hearing Assessment: " normal   Fall risk  Fallen 2 or more times in the past year?: No  Any fall with injury in the past year?: No    Cognitive Screening   1) Repeat 3 items (Leader, Season, Table)    2) Clock draw: NORMAL  3) 3 item recall: Recalls 3 objects  Results: 3 items recalled: COGNITIVE IMPAIRMENT LESS LIKELY    Mini-CogTM Copyright FACUNDO Tobin. Licensed by the author for use in [facility]; reprinted with permission (deandre@[facility]). All rights reserved.      Do you have sleep apnea, excessive snoring or daytime drowsiness?: no    Reviewed and updated as needed this visit by clinical staff   Tobacco  Allergies  Meds  Problems             Reviewed and updated as needed this visit by Provider    Allergies  Meds  Problems            Social History     Tobacco Use     Smoking status: Never     Smokeless tobacco: Never   Substance Use Topics     Alcohol use: Yes     Comment: 3 drinks per week         Alcohol Use 3/16/2023   Prescreen: >3 drinks/day or >7 drinks/week? No           Current providers sharing in care for this patient include:   Patient Care Team:  Emma Guerra MD as PCP - General (Family Practice)  Odalis Pizarro MD as Assigned Surgical Provider  Emma Guerra MD as Assigned PCP    The following health maintenance items are reviewed in Epic and correct as of today:  Health Maintenance   Topic Date Due     MEDICARE ANNUAL WELLNESS VISIT  10/18/2022     VITAMIN B12  10/22/2022     ANNUAL REVIEW OF HM ORDERS  03/16/2024     FALL RISK ASSESSMENT  03/16/2024     DEXA  07/11/2024     MAMMO SCREENING  07/11/2024     COLORECTAL CANCER SCREENING  01/24/2025     LIPID  10/19/2027     ADVANCE CARE PLANNING  03/16/2028     DTAP/TDAP/TD IMMUNIZATION (3 - Td or Tdap) 09/02/2030     HEPATITIS C SCREENING  Completed     HIV SCREENING  Completed     PHQ-2 (once per calendar year)  Completed     INFLUENZA VACCINE  Completed     Pneumococcal Vaccine: 65+ Years  Completed     ZOSTER  "IMMUNIZATION  Completed     COVID-19 Vaccine  Completed     IPV IMMUNIZATION  Aged Out     MENINGITIS IMMUNIZATION  Aged Out     PAP  Discontinued         Breast CA Risk Assessment (FHS-7) 10/17/2021   Do you have a family history of breast, colon, or ovarian cancer? No / Unknown         Mammogram Screening: Recommended annual mammography  Pertinent mammograms are reviewed under the imaging tab.    Review of Systems   Constitutional: Negative for chills and fever.   HENT: Negative for congestion, ear pain, hearing loss and sore throat.    Eyes: Negative for pain and visual disturbance.   Respiratory: Negative for cough and shortness of breath.    Cardiovascular: Negative for chest pain, palpitations and peripheral edema.   Gastrointestinal: Negative for abdominal pain, constipation, diarrhea, heartburn, hematochezia and nausea.   Breasts:  Negative for tenderness, breast mass and discharge.   Genitourinary: Negative for dysuria, frequency, genital sores, hematuria, pelvic pain, urgency, vaginal bleeding and vaginal discharge.   Musculoskeletal: Negative for arthralgias, joint swelling and myalgias.   Skin: Negative for rash.   Neurological: Negative for dizziness, weakness, headaches and paresthesias.   Psychiatric/Behavioral: Negative for mood changes. The patient is not nervous/anxious.      Sees dermatology for yearly skin exams    OBJECTIVE:   /77   Pulse 67   Temp 98  F (36.7  C) (Oral)   Resp 16   Ht 1.57 m (5' 1.81\")   Wt 45 kg (99 lb 3.2 oz)   SpO2 99%   BMI 18.26 kg/m   Estimated body mass index is 18.26 kg/m  as calculated from the following:    Height as of this encounter: 1.57 m (5' 1.81\").    Weight as of this encounter: 45 kg (99 lb 3.2 oz).  Physical Exam  GENERAL: healthy, alert and no distress  EYES: Eyes grossly normal to inspection, PERRL and conjunctivae and sclerae normal  HENT: ear canals and TM's normal, nose and mouth without ulcers or lesions  NECK: no adenopathy, no asymmetry, " masses, or scars and thyroid normal to palpation  RESP: lungs clear to auscultation - no rales, rhonchi or wheezes  BREAST: normal without masses, tenderness or nipple discharge and no palpable axillary masses or adenopathy  CV: regular rate and rhythm, normal S1 S2, no S3 or S4, no murmur, click or rub, no peripheral edema and peripheral pulses strong  ABDOMEN: soft, nontender, no hepatosplenomegaly, no masses and bowel sounds normal  MS: no gross musculoskeletal defects noted, no edema  SKIN: no suspicious lesions or rashes  NEURO: Normal strength and tone, mentation intact and speech normal  PSYCH: mentation appears normal, affect normal/bright    Diagnostic Test Results:  Labs reviewed in Epic  Component      Latest Ref Rng & Units 10/19/2022   Cholesterol      <200 mg/dL 267 (H)   Triglycerides      <150 mg/dL 104   HDL Cholesterol      >=50 mg/dL 92   LDL Cholesterol Calculated      <=100 mg/dL 154 (H)   Non HDL Cholesterol      <130 mg/dL 175 (H)     The 10-year ASCVD risk score (Haile SHIN, et al., 2019) is: 4.8%    Values used to calculate the score:      Age: 67 years      Sex: Female      Is Non- : No      Diabetic: No      Tobacco smoker: No      Systolic Blood Pressure: 123 mmHg      Is BP treated: No      HDL Cholesterol: 92 mg/dL      Total Cholesterol: 267 mg/dL        ASSESSMENT / PLAN:   (Z00.00) Encounter for Medicare annual wellness exam  (primary encounter diagnosis)      (M81.0) Osteoporosis, unspecified osteoporosis type, unspecified pathological fracture presence  Comment: tolerating Fosamax without adverse effects   Plan: Continue alendronate for 5 years total given her hip T score dropped just a touch with last scan.  Continue DEXA every 2 years    (D51.9) Anemia due to vitamin B12 deficiency, unspecified B12 deficiency type  Comment: Supplements multivitamin  Plan: We will screen CBC and B12 levels next year.  Has been stable for a while now.    (E78.5)  Hyperlipidemia LDL goal <160  Comment: She has been working hard on healthy diet and exercise and the LDL has improved somewhat.  Cardiovascular risk score reviewed together  Plan: Continue off statin for now but consider in the future if her ASCVD score rises          COUNSELING:  Reviewed preventive health counseling, as reflected in patient instructions       Regular exercise       Healthy diet/nutrition       Vision screening       Hearing screening       Bladder control       Osteoporosis prevention/bone health       Colon cancer screening       Advanced Planning         She reports that she has never smoked. She has never used smokeless tobacco.      Appropriate preventive services were discussed with this patient, including applicable screening as appropriate for cardiovascular disease, diabetes, osteopenia/osteoporosis, and glaucoma.  As appropriate for age/gender, discussed screening for colorectal cancer, prostate cancer, breast cancer, and cervical cancer. Checklist reviewing preventive services available has been given to the patient.    Reviewed patients plan of care and provided an AVS. The Basic Care Plan (routine screening as documented in Health Maintenance) for Rosa meets the Care Plan requirement. This Care Plan has been established and reviewed with the Patient.    See patient instructions          Emam Guerra MD  [facility]    Identified Health Risks:

## 2023-03-16 NOTE — PATIENT INSTRUCTIONS
Double check if you got the bivalent covid 19 vaccine last fall    Continue fosamax for 5 years total.       Patient Education   Personalized Prevention Plan  You are due for the preventive services outlined below.  Your care team is available to assist you in scheduling these services.  If you have already completed any of these items, please share that information with your care team to update in your medical record.  Health Maintenance Due   Topic Date Due     COVID-19 Vaccine (5 - Booster for Pfizer series) 07/19/2022     Pneumococcal Vaccine (1 - PCV) Never done     Flu Vaccine (1) 09/01/2022     Annual Wellness Visit  10/18/2022     Vitamin B12 Level  10/22/2022

## 2023-03-16 NOTE — NURSING NOTE
Prior to immunization administration, verified patients identity using patient s name and date of birth. Please see Immunization Activity for additional information.     Screening Questionnaire for Adult Immunization    Are you sick today?   No   Do you have allergies to medications, food, a vaccine component or latex?   No   Have you ever had a serious reaction after receiving a vaccination?   No   Do you have a long-term health problem with heart, lung, kidney, or metabolic disease (e.g., diabetes), asthma, a blood disorder, no spleen, complement component deficiency, a cochlear implant, or a spinal fluid leak?  Are you on long-term aspirin therapy?   No   Do you have cancer, leukemia, HIV/AIDS, or any other immune system problem?   No   Do you have a parent, brother, or sister with an immune system problem?   No   In the past 3 months, have you taken medications that affect  your immune system, such as prednisone, other steroids, or anticancer drugs; drugs for the treatment of rheumatoid arthritis, Crohn s disease, or psoriasis; or have you had radiation treatments?   No   Have you had a seizure, or a brain or other nervous system problem?   No   During the past year, have you received a transfusion of blood or blood    products, or been given immune (gamma) globulin or antiviral drug?   No   For women: Are you pregnant or is there a chance you could become       pregnant during the next month?   No   Have you received any vaccinations in the past 4 weeks?   No     Immunization questionnaire answers were all negative.        Per orders of Dr. Guerra2, injection of Pneumococcal 20 given by Jessica Garcia MA. Patient instructed to remain in clinic for 15 minutes afterwards, and to report any adverse reaction to me immediately.       Screening performed by Jessica Garcia MA on 3/16/2023 at 11:06 AM.

## 2023-05-22 NOTE — PROGRESS NOTES
[facility] Dermatology Note  Encounter Date: May 24, 2023  Office Visit     Dermatology Problem List:  Last skin check 5/24/2023, recommended yearly.  1. 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 Has a cabin in [place]. Runs 5 miles a day. Spends a lot of time outdoors. Sometimes wears sunscreen. History of tanning bed use before vacations.  ____________________________________________     ASSESSMENT/PLAN:    # Multiple clinically benign nevi on the trunk and extremities. No treatment is necessary at this time unless the lesion changes or becomes symptomatic.    - ABCDEs of melanoma were discussed and self skin checks were advised.    # Seborrheic keratosis, non irritated on the trunk and extremities. Explained to patient benign nature of lesion. No treatment is necessary at this time unless the lesion changes or becomes symptomatic.     - Monitor for changes.    # Cherry Angiomas - trunk and extremities. Explained to patient benign nature of lesion. No treatment is necessary at this time unless the lesion changes or becomes symptomatic.      # Solar lentigines on the sun exposed skin areas. Benign nature was discussed. No further intervention required at this time.    - Sun precaution was advised including the use of sun screens of SPF 30 or higher, sun protective clothing, and avoidance of tanning beds.        Procedures Performed:   None.    Follow-up: 1 year(s) in-person, or earlier for new or changing lesions.     Staff and Scribe:     Scribe Disclosure:   I, Karlee North, am serving as a scribe to document services personally performed by this physician, Diamond Singleton PA-C, based on data collection and the provider's statements to me.   Provider Disclosure:   The documentation recorded by the scribe  "accurately reflects the services I personally performed and the decisions made by me.    All risks, benefits and alternatives were discussed with patient.  Patient is in agreement and understands the assessment and plan.  All questions were answered.    Diamond Singleton PA-C, MPAS  [facility] [place]: Phone: [phone], Fax: [phone]  [facility]: Phone: [phone],  Fax: [phone]  [facility]: Phone: [phone], Fax: [phone]  ____________________________________________    CC: Derm Problem (Rosa is here today for skin check, pt states \"no concerns\")    HPI:  Ms. Rosa Craven is a(n) 67 year old female who presents today as a return patient for a skin check. Pt reports having no concerns.     Last seen 9/22/2021 by Dr. Pizarro. At that time a biopsy was obtained on the left medial cheek. The results of the biopsy indicated that the lesion was folliculitis.     Patient is otherwise feeling well, without additional skin concerns.    Labs Reviewed:  N/A    Physical Exam:  Vitals: There were no vitals taken for this visit.  SKIN: Total skin excluding the undergarment areas was performed. The exam included the head/face, neck, both arms, chest, back, abdomen, both legs, digits and/or nails.   - Foster type II.  - Skin is tanned.  - There are dome shaped bright red papules on the trunk and extremities.   - Multiple regular brown pigmented macules and papules are identified on the trunk and extremities, <100.  - Scattered brown macules on sun exposed areas.  - There are waxy stuck on tan to brown papules on the trunk and extremities.    - No other lesions of concern on areas examined.     Medications:  Current Outpatient Medications   Medication     alendronate (FOSAMAX) 70 MG tablet     Calcium Carb-Cholecalciferol (CALCIUM 600 + D PO)     loratadine (CLARITIN) 10 MG tablet     Multiple " Vitamin (MULTIVITAMIN OR)     No current facility-administered medications for this visit.      Past Medical History:   Patient Active Problem List   Diagnosis     CARDIOVASCULAR SCREENING; LDL GOAL LESS THAN 160     Anemia     B12 deficiency anemia     Advanced directives, counseling/discussion     Osteoporosis, unspecified osteoporosis type, unspecified pathological fracture presence     Hyperlipidemia LDL goal <160     Past Medical History:   Diagnosis Date     Anemia      NO ACTIVE PROBLEMS      Other dyschromia

## 2023-05-24 ENCOUNTER — OFFICE VISIT (OUTPATIENT)
Dept: DERMATOLOGY | Facility: CLINIC | Age: 68
End: 2023-05-24
Payer: MEDICARE

## 2023-05-24 DIAGNOSIS — D18.01 CHERRY ANGIOMA: ICD-10-CM

## 2023-05-24 DIAGNOSIS — L81.4 SOLAR LENTIGO: Primary | ICD-10-CM

## 2023-05-24 DIAGNOSIS — D22.9 MULTIPLE BENIGN NEVI: ICD-10-CM

## 2023-05-24 PROCEDURE — 99213 OFFICE O/P EST LOW 20 MIN: CPT | Performed by: PHYSICIAN ASSISTANT

## 2023-05-24 ASSESSMENT — PAIN SCALES - GENERAL: PAINLEVEL: NO PAIN (0)

## 2023-05-24 NOTE — NURSING NOTE
"Rosa Craven's goals for this visit include:   Chief Complaint   Patient presents with     Derm Problem     Rosa is here today for skin check, pt states \"no concerns\"       She requests these members of her care team be copied on today's visit information:     PCP: Emma Guerra    Referring Provider:  No referring provider defined for this encounter.    There were no vitals taken for this visit.    Do you need any medication refills at today's visit? No    Yakelin Lucia LPN      "

## 2023-05-24 NOTE — PATIENT INSTRUCTIONS
Patient Education     Checking for Skin Cancer  You can find cancer early by checking your skin each month. There are 3 kinds of skin cancer. They are melanoma, basal cell carcinoma, and squamous cell carcinoma. Doing monthly skin checks is the best way to find new marks or skin changes. Follow the instructions below for checking your skin.   The ABCDEs of checking moles for melanoma   Check your moles or growths for signs of melanoma using ABCDE:   Asymmetry: the sides of the mole or growth don t match  Border: the edges are ragged, notched, or blurred  Color: the color within the mole or growth varies  Diameter: the mole or growth is larger than 6 mm (size of a pencil eraser)  Evolving: the size, shape, or color of the mole or growth is changing (evolving is not shown in the images below)    Checking for other types of skin cancer  Basal cell carcinoma or squamous cell carcinoma have symptoms such as:     A spot or mole that looks different from all other marks on your skin  Changes in how an area feels, such as itching, tenderness, or pain  Changes in the skin's surface, such as oozing, bleeding, or scaliness  A sore that does not heal  New swelling or redness beyond the border of a mole    Who s at risk?  Anyone can get skin cancer. But you are at greater risk if you have:   Fair skin, light-colored hair, or light-colored eyes  Many moles or abnormal moles on your skin  A history of sunburns from sunlight or tanning beds  A family history of skin cancer  A history of exposure to radiation or chemicals  A weakened immune system  If you have had skin cancer in the past, you are at risk for recurring skin cancer.   How to check your skin  Do your monthly skin checkups in front of a full-length mirror. Check all parts of your body, including your:   Head (ears, face, neck, and scalp)  Torso (front, back, and sides)  Arms (tops, undersides, upper, and lower armpits)  Hands (palms, backs, and fingers, including  under the nails)  Buttocks and genitals  Legs (front, back, and sides)  Feet (tops, soles, toes, including under the nails, and between toes)  If you have a lot of moles, take digital photos of them each month. Make sure to take photos both up close and from a distance. These can help you see if any moles change over time.   Most skin changes are not cancer. But if you see any changes in your skin, call your doctor right away. Only he or she can diagnose a problem. If you have skin cancer, seeing your doctor can be the first step toward getting the treatment that could save your life.   Over 40 Females last reviewed this educational content on 4/1/2019 2000-2020 [employer]. [address] [place] [address], [place] [address]. All rights reserved. This information is not intended as a substitute for professional medical care. Always follow your healthcare professional's instructions.       When should I call my doctor?  If you are worsening or not improving, please, contact us or seek urgent care as noted below.     Who should I call with questions (adults)?  [facility] (adult and pediatric): [phone]  [facility] (adult): [phone]  For urgent needs outside of business hours call the [facility] at [phone] and ask for the dermatology resident on call to be paged  If this is a medical emergency and you are unable to reach an ER, Call [phone]    Who should I call with questions (pediatric)?  [facility]- Pediatric Dermatology  Dr. Brianna Stewart, Dr. Abrahan Vale, Dr. Lulu Lee, SHERRIE Holman, Dr. Jessa Mendoza, Dr. Dayna Hamilton & Dr. Pedro Luis Lucia  Non-urgent nurse triage line; [phone]- Hina and Janelle FALCON Care Coordinatorpaddy Jj (/Complex ) [phone]    If you need a prescription refill, please contact your pharmacy. Refills are approved or denied by our  Physicians during normal business hours, Monday through Fridays  Per office policy, refills will not be granted if you have not been seen within the past year (or sooner depending on your child's condition)    Scheduling Information:  Pediatric Appointment Scheduling and Call Center [phone]  Radiology Scheduling- [phone]  Sedation Unit Scheduling- [phone]  [place] Scheduling- General [phone]; Pediatric Dermatology [phone]  Main  Services: [phone]  Serbian: [phone]  Bhutanese: [phone]  Hmong/Sao Tomean/Kyrgyz: [phone]  Preadmission Nursing Department Fax Number: [phone] (Fax all pre-operative paperwork to this number)    For urgent matters arising during evenings, weekends, or holidays that cannot wait for normal business hours please call [phone] and ask for the dermatology resident on call to be paged.

## 2023-05-24 NOTE — LETTER
5/24/2023         RE: Rosa Craven  [address] [address]        Dear Colleague,    Thank you for referring your patient, Rosa Craven, to the [facility]. Please see a copy of my visit note below.    [facility] Dermatology Note  Encounter Date: May 24, 2023  Office Visit     Dermatology Problem List:  Last skin check 5/24/2023, recommended yearly.  1. 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 Has a cabin in [place]. Runs 5 miles a day. Spends a lot of time outdoors. Sometimes wears sunscreen. History of tanning bed use before vacations.  ____________________________________________     ASSESSMENT/PLAN:    # Multiple clinically benign nevi on the trunk and extremities. No treatment is necessary at this time unless the lesion changes or becomes symptomatic.    - ABCDEs of melanoma were discussed and self skin checks were advised.    # Seborrheic keratosis, non irritated on the trunk and extremities. Explained to patient benign nature of lesion. No treatment is necessary at this time unless the lesion changes or becomes symptomatic.     - Monitor for changes.    # Cherry Angiomas - trunk and extremities. Explained to patient benign nature of lesion. No treatment is necessary at this time unless the lesion changes or becomes symptomatic.      # Solar lentigines on the sun exposed skin areas. Benign nature was discussed. No further intervention required at this time.    - Sun precaution was advised including the use of sun screens of SPF 30 or higher, sun protective clothing, and avoidance of tanning beds.        Procedures Performed:   None.    Follow-up: 1 year(s) in-person, or earlier for new or changing lesions.     Staff and Scribe:     Scribe  "Disclosure:   I, Karlee North, am serving as a scribe to document services personally performed by this physician, Diamond Singleton PA-C, based on data collection and the provider's statements to me.   Provider Disclosure:   The documentation recorded by the scribe accurately reflects the services I personally performed and the decisions made by me.    All risks, benefits and alternatives were discussed with patient.  Patient is in agreement and understands the assessment and plan.  All questions were answered.    Diamond Singleton PA-C, [facility]  [facility]: Phone: [phone], Fax: [phone]  [facility]: Phone: [phone],  Fax: [phone]  [facility]: Phone: [phone], Fax: [phone]  ____________________________________________    CC: Derm Problem (Rosa is here today for skin check, pt states \"no concerns\")    HPI:  Ms. Rosa Craven is a(n) 67 year old female who presents today as a return patient for a skin check. Pt reports having no concerns.     Last seen 9/22/2021 by Dr. Pizarro. At that time a biopsy was obtained on the left medial cheek. The results of the biopsy indicated that the lesion was folliculitis.     Patient is otherwise feeling well, without additional skin concerns.    Labs Reviewed:  N/A    Physical Exam:  Vitals: There were no vitals taken for this visit.  SKIN: Total skin excluding the undergarment areas was performed. The exam included the head/face, neck, both arms, chest, back, abdomen, both legs, digits and/or nails.   - Foster type II.  - Skin is tanned.  - There are dome shaped bright red papules on the trunk and extremities.   - Multiple regular brown pigmented macules and papules are identified on the trunk and extremities, <100.  - Scattered brown macules on sun exposed areas.  - There are waxy stuck on tan to brown papules on the trunk and " extremities.    - No other lesions of concern on areas examined.     Medications:  Current Outpatient Medications   Medication     alendronate (FOSAMAX) 70 MG tablet     Calcium Carb-Cholecalciferol (CALCIUM 600 + D PO)     loratadine (CLARITIN) 10 MG tablet     Multiple Vitamin (MULTIVITAMIN OR)     No current facility-administered medications for this visit.      Past Medical History:   Patient Active Problem List   Diagnosis     CARDIOVASCULAR SCREENING; LDL GOAL LESS THAN 160     Anemia     B12 deficiency anemia     Advanced directives, counseling/discussion     Osteoporosis, unspecified osteoporosis type, unspecified pathological fracture presence     Hyperlipidemia LDL goal <160     Past Medical History:   Diagnosis Date     Anemia      NO ACTIVE PROBLEMS      Other dyschromia        Again, thank you for allowing me to participate in the care of your patient.        Sincerely,        Diamond Singleton PA-C

## 2023-08-04 DIAGNOSIS — M81.0 OSTEOPOROSIS, UNSPECIFIED OSTEOPOROSIS TYPE, UNSPECIFIED PATHOLOGICAL FRACTURE PRESENCE: ICD-10-CM

## 2023-08-04 RX ORDER — ALENDRONATE SODIUM 70 MG/1
TABLET ORAL
Qty: 12 TABLET | Refills: 2 | Status: SHIPPED | OUTPATIENT
Start: 2023-08-04 | End: 2024-06-12

## 2023-09-06 ENCOUNTER — ANCILLARY PROCEDURE (OUTPATIENT)
Dept: MAMMOGRAPHY | Facility: CLINIC | Age: 68
End: 2023-09-06
Attending: FAMILY MEDICINE
Payer: MEDICARE

## 2023-09-06 DIAGNOSIS — Z12.31 VISIT FOR SCREENING MAMMOGRAM: ICD-10-CM

## 2023-09-06 PROCEDURE — 77067 SCR MAMMO BI INCL CAD: CPT | Performed by: STUDENT IN AN ORGANIZED HEALTH CARE EDUCATION/TRAINING PROGRAM

## 2023-09-06 PROCEDURE — 77063 BREAST TOMOSYNTHESIS BI: CPT | Performed by: STUDENT IN AN ORGANIZED HEALTH CARE EDUCATION/TRAINING PROGRAM

## 2023-12-19 ENCOUNTER — ALLIED HEALTH/NURSE VISIT (OUTPATIENT)
Dept: FAMILY MEDICINE | Facility: OTHER | Age: 68
End: 2023-12-19
Attending: FAMILY MEDICINE

## 2023-12-19 DIAGNOSIS — Z23 HIGH PRIORITY FOR 2019-NCOV VACCINE: Primary | ICD-10-CM

## 2023-12-19 PROCEDURE — 90480 ADMN SARSCOV2 VAC 1/ONLY CMP: CPT

## 2023-12-19 PROCEDURE — 91320 SARSCV2 VAC 30MCG TRS-SUC IM: CPT

## 2024-04-15 ENCOUNTER — MYC MEDICAL ADVICE (OUTPATIENT)
Dept: FAMILY MEDICINE | Facility: CLINIC | Age: 69
End: 2024-04-15
Payer: MEDICARE

## 2024-04-15 DIAGNOSIS — Z13.220 SCREENING CHOLESTEROL LEVEL: Primary | ICD-10-CM

## 2024-04-15 DIAGNOSIS — Z13.1 SCREENING FOR DIABETES MELLITUS: ICD-10-CM

## 2024-04-15 NOTE — TELEPHONE ENCOUNTER
Routing to provider to review and advise.     Nelli Vivar, SILN, RN   [facility]

## 2024-04-24 ENCOUNTER — LAB (OUTPATIENT)
Dept: LAB | Facility: OTHER | Age: 69
End: 2024-04-24
Attending: FAMILY MEDICINE
Payer: MEDICARE

## 2024-04-24 DIAGNOSIS — Z13.220 SCREENING CHOLESTEROL LEVEL: ICD-10-CM

## 2024-04-24 DIAGNOSIS — Z13.1 SCREENING FOR DIABETES MELLITUS: ICD-10-CM

## 2024-04-24 LAB
CHOLEST SERPL-MCNC: 269 MG/DL
FASTING STATUS PATIENT QL REPORTED: YES
FASTING STATUS PATIENT QL REPORTED: YES
GLUCOSE SERPL-MCNC: 104 MG/DL (ref 70–99)
HDLC SERPL-MCNC: 91 MG/DL
LDLC SERPL CALC-MCNC: 160 MG/DL
NONHDLC SERPL-MCNC: 178 MG/DL
TRIGL SERPL-MCNC: 88 MG/DL

## 2024-04-24 PROCEDURE — 36415 COLL VENOUS BLD VENIPUNCTURE: CPT | Mod: ZL

## 2024-04-24 PROCEDURE — 82947 ASSAY GLUCOSE BLOOD QUANT: CPT | Mod: ZL

## 2024-04-24 PROCEDURE — 80061 LIPID PANEL: CPT | Mod: ZL

## 2024-04-24 NOTE — RESULT ENCOUNTER NOTE
Rosa,  Thanks for coming in for lab work.   The LDL (bad cholesterol) has crept up a bit but you continue to have very good HDL (good cholesterol) and triglycerides.   The fasting blood glucose is elevated in the pre-diabetic range (100-125). This puts you at risk for developing diabetes in the future.  Lifestyle measures will help to lower your blood glucose levels and lower the risks of diabetes. These measures include regular exercise, weight loss/maintaining a healthy body weight, and moderating/decreasing carbohydrates (sugar, bread, pasta, rice, baked goods, potatoes, etc) in your diet. We will continue to monitor your blood glucose annually, but please be seen sooner  if you develop any new signs or symptoms of diabetes (increase thirst or urination, fatigue, blurry vision, unexplained weight loss).    We can review these results in more detail at your upcoming visit.   Please MyChart or call if you have any concerns or questions.   Sincerely,  Emma Guerra MD

## 2024-05-19 SDOH — HEALTH STABILITY: PHYSICAL HEALTH: ON AVERAGE, HOW MANY DAYS PER WEEK DO YOU ENGAGE IN MODERATE TO STRENUOUS EXERCISE (LIKE A BRISK WALK)?: 6 DAYS

## 2024-05-19 SDOH — HEALTH STABILITY: PHYSICAL HEALTH: ON AVERAGE, HOW MANY MINUTES DO YOU ENGAGE IN EXERCISE AT THIS LEVEL?: 40 MIN

## 2024-05-19 ASSESSMENT — SOCIAL DETERMINANTS OF HEALTH (SDOH): HOW OFTEN DO YOU GET TOGETHER WITH FRIENDS OR RELATIVES?: ONCE A WEEK

## 2024-05-22 ENCOUNTER — OFFICE VISIT (OUTPATIENT)
Dept: FAMILY MEDICINE | Facility: CLINIC | Age: 69
End: 2024-05-22
Payer: MEDICARE

## 2024-05-22 VITALS
HEIGHT: 62 IN | OXYGEN SATURATION: 98 % | TEMPERATURE: 98.6 F | RESPIRATION RATE: 11 BRPM | WEIGHT: 100.2 LBS | BODY MASS INDEX: 18.44 KG/M2 | SYSTOLIC BLOOD PRESSURE: 117 MMHG | HEART RATE: 63 BPM | DIASTOLIC BLOOD PRESSURE: 78 MMHG

## 2024-05-22 DIAGNOSIS — R73.01 ELEVATED FASTING BLOOD SUGAR: ICD-10-CM

## 2024-05-22 DIAGNOSIS — E78.5 HYPERLIPIDEMIA LDL GOAL <160: ICD-10-CM

## 2024-05-22 DIAGNOSIS — Z00.00 ENCOUNTER FOR MEDICARE ANNUAL WELLNESS EXAM: Primary | ICD-10-CM

## 2024-05-22 DIAGNOSIS — E28.39 ESTROGEN DEFICIENCY: ICD-10-CM

## 2024-05-22 DIAGNOSIS — D51.9 ANEMIA DUE TO VITAMIN B12 DEFICIENCY, UNSPECIFIED B12 DEFICIENCY TYPE: ICD-10-CM

## 2024-05-22 DIAGNOSIS — M81.0 OSTEOPOROSIS, UNSPECIFIED OSTEOPOROSIS TYPE, UNSPECIFIED PATHOLOGICAL FRACTURE PRESENCE: ICD-10-CM

## 2024-05-22 LAB
BASOPHILS # BLD AUTO: 0.1 10E3/UL (ref 0–0.2)
BASOPHILS NFR BLD AUTO: 1 %
EOSINOPHIL # BLD AUTO: 0.1 10E3/UL (ref 0–0.7)
EOSINOPHIL NFR BLD AUTO: 2 %
ERYTHROCYTE [DISTWIDTH] IN BLOOD BY AUTOMATED COUNT: 12.8 % (ref 10–15)
HBA1C MFR BLD: 5.5 % (ref 0–5.6)
HCT VFR BLD AUTO: 42.6 % (ref 35–47)
HGB BLD-MCNC: 14.1 G/DL (ref 11.7–15.7)
IMM GRANULOCYTES # BLD: 0 10E3/UL
IMM GRANULOCYTES NFR BLD: 0 %
LYMPHOCYTES # BLD AUTO: 1.6 10E3/UL (ref 0.8–5.3)
LYMPHOCYTES NFR BLD AUTO: 33 %
MCH RBC QN AUTO: 30.7 PG (ref 26.5–33)
MCHC RBC AUTO-ENTMCNC: 33.1 G/DL (ref 31.5–36.5)
MCV RBC AUTO: 93 FL (ref 78–100)
MONOCYTES # BLD AUTO: 0.4 10E3/UL (ref 0–1.3)
MONOCYTES NFR BLD AUTO: 8 %
NEUTROPHILS # BLD AUTO: 2.8 10E3/UL (ref 1.6–8.3)
NEUTROPHILS NFR BLD AUTO: 56 %
PLATELET # BLD AUTO: 200 10E3/UL (ref 150–450)
RBC # BLD AUTO: 4.6 10E6/UL (ref 3.8–5.2)
WBC # BLD AUTO: 4.9 10E3/UL (ref 4–11)

## 2024-05-22 PROCEDURE — 83036 HEMOGLOBIN GLYCOSYLATED A1C: CPT | Performed by: FAMILY MEDICINE

## 2024-05-22 PROCEDURE — 85025 COMPLETE CBC W/AUTO DIFF WBC: CPT | Performed by: FAMILY MEDICINE

## 2024-05-22 PROCEDURE — 36415 COLL VENOUS BLD VENIPUNCTURE: CPT | Performed by: FAMILY MEDICINE

## 2024-05-22 PROCEDURE — G0439 PPPS, SUBSEQ VISIT: HCPCS | Performed by: FAMILY MEDICINE

## 2024-05-22 PROCEDURE — 99214 OFFICE O/P EST MOD 30 MIN: CPT | Mod: 25 | Performed by: FAMILY MEDICINE

## 2024-05-22 PROCEDURE — 82607 VITAMIN B-12: CPT | Performed by: FAMILY MEDICINE

## 2024-05-22 RX ORDER — RESPIRATORY SYNCYTIAL VIRUS VACCINE 120MCG/0.5
0.5 KIT INTRAMUSCULAR ONCE
Qty: 1 EACH | Refills: 0 | Status: CANCELLED | OUTPATIENT
Start: 2024-05-22 | End: 2024-05-22

## 2024-05-22 ASSESSMENT — PAIN SCALES - GENERAL: PAINLEVEL: NO PAIN (0)

## 2024-05-22 NOTE — PROGRESS NOTES
Preventive Care Visit  [facility]  Emma Ara Guerra MD, Family Medicine  May 22, 2024      Assessment & Plan     Encounter for Medicare annual wellness exam    Anemia due to vitamin B12 deficiency, unspecified B12 deficiency type  History of, on daily multivitamin.  Will check levels  - CBC with Platelets & Differential; Future  - Vitamin B12; Future  - CBC with Platelets & Differential  - Vitamin B12    Hyperlipidemia LDL goal <160  ASCVD risk score is 5%.  She is interested in a CT coronary calcium scan to help determine if statin is warranted.  - CT Coronary Calcium Scan; Future    Elevated fasting blood sugar  She is working on decreasing processed sugar in her diet.  - Hemoglobin A1c; Future  - Hemoglobin A1c    Estrogen deficiency    - DX Bone Density; Future    Osteoporosis, unspecified osteoporosis type, unspecified pathological fracture presence  On alendronate almost 5 years.  Will get DEXA scan and if bone density is stable consider stopping the alendronate for a drug holiday.  She continues to work on weightbearing exercises, calcium and vitamin D intake            Counseling  Appropriate preventive services were discussed with this patient, including applicable screening as appropriate for fall prevention, nutrition, physical activity, Tobacco-use cessation, weight loss and cognition.  Checklist reviewing preventive services available has been given to the patient.  Reviewed patient's diet, addressing concerns and/or questions.   She is at risk for psychosocial distress and has been provided with information to reduce risk.         See Patient Instructions    Olayinka Lee is a 68 year old, presenting for the following:  Annual Visit        5/22/2024     1:58 PM   Additional Questions   Roomed by Bertha BUCK   Accompanied by Self         5/22/2024     1:58 PM   Patient Reported Additional Medications   Patient reports taking the following new medications None          Health Care Directive  Patient does not have a Health Care Directive or Living Will: Discussed advance care planning with patient; information given to patient to review.    HPI    Declines covid booster      5/19/2024   General Health   How would you rate your overall physical health? Good   Feel stress (tense, anxious, or unable to sleep) Only a little   (!) STRESS CONCERN      5/19/2024   Nutrition   Diet: Regular (no restrictions)         5/19/2024   Exercise   Days per week of moderate/strenous exercise 6 days   Average minutes spent exercising at this level 40 min         5/19/2024   Social Factors   Frequency of gathering with friends or relatives Once a week   Worry food won't last until get money to buy more No   Food not last or not have enough money for food? No   Do you have housing?  Yes   Are you worried about losing your housing? No   Lack of transportation? No   Unable to get utilities (heat,electricity)? No         5/19/2024   Fall Risk   Fallen 2 or more times in the past year? No    No   Trouble with walking or balance? No    No          5/19/2024   Activities of Daily Living- Home Safety   Needs help with the following daily activites None of the above   Safety concerns in the home None of the above         5/19/2024   Dental   Dentist two times every year? Yes         5/19/2024   Hearing Screening   Hearing concerns? None of the above         5/19/2024   Driving Risk Screening   Patient/family members have concerns about driving No         5/19/2024   General Alertness/Fatigue Screening   Have you been more tired than usual lately? No         5/19/2024   Urinary Incontinence Screening   Bothered by leaking urine in past 6 months No         5/19/2024   TB Screening   Were you born outside of the [place]? No         Today's PHQ-2 Score:       5/22/2024     1:56 PM   PHQ-2 ( 1999 Pfizer)   Q1: Little interest or pleasure in doing things 0   Q2: Feeling down, depressed or hopeless 0   PHQ-2 Score  0   Q1: Little interest or pleasure in doing things Not at all   Q2: Feeling down, depressed or hopeless Not at all   PHQ-2 Score 0           5/19/2024   Substance Use   Alcohol more than 3/day or more than 7/wk No   Do you have a current opioid prescription? No   How severe/bad is pain from 1 to 10? 0/10 (No Pain)   Do you use any other substances recreationally? No     Social History     Tobacco Use    Smoking status: Never    Smokeless tobacco: Never   Vaping Use    Vaping status: Never Used   Substance Use Topics    Alcohol use: Yes     Comment: 3 drinks per week    Drug use: No           9/6/2023   LAST FHS-7 RESULTS   1st degree relative breast or ovarian cancer No   Any relative bilateral breast cancer No   Any male have breast cancer No   Any ONE woman have BOTH breast AND ovarian cancer No   Any woman with breast cancer before 50yrs No   2 or more relatives with breast AND/OR ovarian cancer No   2 or more relatives with breast AND/OR bowel cancer No              History of abnormal Pap smear: No - age 65 or older with pap indicated due to inadequate prior screening (3 consecutive negative cytology results, 2 consecutive negative cotesting results, or 2 consecutive negative HrHPV test results within 10 years, with the most recent test occurring within the recommended screening interval for the test used)        Latest Ref Rng & Units 3/18/2019    10:49 AM 3/18/2019    10:39 AM 11/12/2015    12:00 AM   PAP / HPV   PAP (Historical)   NIL  NIL    HPV 16 DNA NEG^Negative Negative      HPV 18 DNA NEG^Negative Negative      Other HR HPV NEG^Negative Negative        ASCVD Risk   The 10-year ASCVD risk score (Haile SHIN, et al., 2019) is: 5%    Values used to calculate the score:      Age: 68 years      Sex: Female      Is Non- : No      Diabetic: No      Tobacco smoker: No      Systolic Blood Pressure: 117 mmHg      Is BP treated: No      HDL Cholesterol: 91 mg/dL      Total  Cholesterol: 269 mg/dL            Reviewed and updated as needed this visit by Provider   Tobacco   Meds  Problems   Surg Hx  Fam Hx              Current providers sharing in care for this patient include:  Patient Care Team:  Emma Guerra MD as PCP - General (Family Practice)  Emma Guerra MD as Assigned PCP  Diamond Singleton PA-C as Assigned Surgical Provider    The following health maintenance items are reviewed in Epic and correct as of today:  Health Maintenance   Topic Date Due    RSV VACCINE (Pregnancy & 60+) (1 - 1-dose 60+ series) Never done    CBC W/DIFFERENTIAL  10/22/2022    VITAMIN B12  10/22/2022    COVID-19 Vaccine (7 - 2023-24 season) 04/19/2024    DEXA  07/11/2024    COLORECTAL CANCER SCREENING  01/24/2025    LIPID  04/24/2025    GLUCOSE  04/24/2025    MEDICARE ANNUAL WELLNESS VISIT  05/22/2025    ANNUAL REVIEW OF HM ORDERS  05/22/2025    FALL RISK ASSESSMENT  05/22/2025    MAMMO SCREENING  09/06/2025    ADVANCE CARE PLANNING  05/22/2029    DTAP/TDAP/TD IMMUNIZATION (3 - Td or Tdap) 09/02/2030    HEPATITIS C SCREENING  Completed    PHQ-2 (once per calendar year)  Completed    INFLUENZA VACCINE  Completed    Pneumococcal Vaccine: 65+ Years  Completed    ZOSTER IMMUNIZATION  Completed    IPV IMMUNIZATION  Aged Out    HPV IMMUNIZATION  Aged Out    MENINGITIS IMMUNIZATION  Aged Out    RSV MONOCLONAL ANTIBODY  Aged Out    PAP  Discontinued     The 10-year ASCVD risk score (Haile SHIN, et al., 2019) is: 5%    Values used to calculate the score:      Age: 68 years      Sex: Female      Is Non- : No      Diabetic: No      Tobacco smoker: No      Systolic Blood Pressure: 117 mmHg      Is BP treated: No      HDL Cholesterol: 91 mg/dL      Total Cholesterol: 269 mg/dL    Trying to lower sugar intake          Objective    Exam  /78 (BP Location: Right arm, Patient Position: Sitting, Cuff Size: Adult Regular)   Pulse 63   Temp 98.6  F (37  C)  "(Oral)   Resp 11   Ht 1.572 m (5' 1.9\")   Wt 45.5 kg (100 lb 3.2 oz)   SpO2 98%   BMI 18.39 kg/m     Estimated body mass index is 18.39 kg/m  as calculated from the following:    Height as of this encounter: 1.572 m (5' 1.9\").    Weight as of this encounter: 45.5 kg (100 lb 3.2 oz).    Physical Exam           5/22/2024   Mini Cog   Clock Draw Score 2 Normal   3 Item Recall 2 objects recalled   Mini Cog Total Score 4              Signed Electronically by: Emma Guerra MD    "

## 2024-05-22 NOTE — PATIENT INSTRUCTIONS
"Rsv vaccine later summer/early fall. Get vaccine at pharmacy    Preventive Care Advice   This is general advice we often give to help people stay healthy. Your care team may have specific advice just for you. Please talk to your care team about your own preventive care needs.  Lifestyle  Exercise at least 150 minutes each week (30 minutes a day, 5 days a week).  Do muscle strengthening activities 2 days a week. These help control your weight and prevent disease.  No smoking.  Wear sunscreen to prevent skin cancer.  Have your home tested for radon every 2 to 5 years. Radon is a colorless, odorless gas that can harm your lungs. To learn more, go to www.health.[facility].mn.us and search for \"Radon in Homes.\"  Keep guns unloaded and locked up in a safe place like a safe or gun vault, or, use a gun lock and hide the keys. Always lock away bullets separately. To learn more, visit [employer].[place].gov and search for \"safe gun storage.\"  Nutrition  Eat 5 or more servings of fruits and vegetables each day.  Try wheat bread, brown rice and whole grain pasta (instead of white bread, rice, and pasta).  Get enough calcium and vitamin D. Check the label on foods and aim for 100% of the RDA (recommended daily allowance).  Regular exams  Have a dental exam and cleaning every 6 months.  See your health care team every year to talk about:  Any changes in your health.  Any medicines your care team has prescribed.  Preventive care, family planning, and ways to prevent chronic diseases.  Shots (vaccines)   HPV shots (up to age 26), if you've never had them before.  Hepatitis B shots (up to age 59), if you've never had them before.  COVID-19 shot: Get this shot when it's due.  Flu shot: Get a flu shot every year.  Tetanus shot: Get a tetanus shot every 10 years.  Pneumococcal, hepatitis A, and RSV shots: Ask your care team if you need these based on your risk.  Shingles shot (for age 50 and up).  General health tests  Diabetes screening:  Starting at " age 35, Get screened for diabetes at least every 3 years.  If you are younger than age 35, ask your care team if you should be screened for diabetes.  Cholesterol test: At age 39, start having a cholesterol test every 5 years, or more often if advised.  Bone density scan (DEXA): At age 50, ask your care team if you should have this scan for osteoporosis (brittle bones).  Hepatitis C: Get tested at least once in your life.  Abdominal aortic aneurysm screening: Talk to your doctor about having this screening if you:  Have ever smoked; and  Are biologically male; and  Are between the ages of 65 and 75.  STIs (sexually transmitted infections)  Before age 24: Ask your care team if you should be screened for STIs.  After age 24: Get screened for STIs if you're at risk. You are at risk for STIs (including HIV) if:  You are sexually active with more than one person.  You don't use condoms every time.  You or a partner was diagnosed with a sexually transmitted infection.  If you are at risk for HIV, ask about PrEP medicine to prevent HIV.  Get tested for HIV at least once in your life, whether you are at risk for HIV or not.  Cancer screening tests  Cervical cancer screening: If you have a cervix, begin getting regular cervical cancer screening tests at age 21. Most people who have regular screenings with normal results can stop after age 65. Talk about this with your provider.  Breast cancer scan (mammogram): If you've ever had breasts, begin having regular mammograms starting at age 40. This is a scan to check for breast cancer.  Colon cancer screening: It is important to start screening for colon cancer at age 45.  Have a colonoscopy test every 10 years (or more often if you're at risk) Or, ask your provider about stool tests like a FIT test every year or Cologuard test every 3 years.  To learn more about your testing options, visit: www.[employer]/830186.pdf.  For help making a decision, visit: sergo/kl05070.  Prostate  cancer screening test: If you have a prostate and are age 55 to 69, ask your provider if you would benefit from a yearly prostate cancer screening test.  Lung cancer screening: If you are a current or former smoker age 50 to 80, ask your care team if ongoing lung cancer screenings are right for you.  For informational purposes only. Not to replace the advice of your health care provider. Copyright   2023 [facility]. All rights reserved. Clinically reviewed by the [facility]. [employer] 532695 - REV 04/24.    Learning About Stress  What is stress?     Stress is your body's response to a hard situation. Your body can have a physical, emotional, or mental response. Stress is a fact of life for most people, and it affects everyone differently. What causes stress for you may not be stressful for someone else.  A lot of things can cause stress. You may feel stress when you go on a job interview, take a test, or run a race. This kind of short-term stress is normal and even useful. It can help you if you need to work hard or react quickly. For example, stress can help you finish an important job on time.  Long-term stress is caused by ongoing stressful situations or events. Examples of long-term stress include long-term health problems, ongoing problems at work, or conflicts in your family. Long-term stress can harm your health.  How does stress affect your health?  When you are stressed, your body responds as though you are in danger. It makes hormones that speed up your heart, make you breathe faster, and give you a burst of energy. This is called the fight-or-flight stress response. If the stress is over quickly, your body goes back to normal and no harm is done.  But if stress happens too often or lasts too long, it can have bad effects. Long-term stress can make you more likely to get sick, and it can make symptoms of some diseases worse. If you tense up when you are stressed,  you may develop neck, shoulder, or low back pain. Stress is linked to high blood pressure and heart disease.  Stress also harms your emotional health. It can make you villarreal, tense, or depressed. Your relationships may suffer, and you may not do well at work or school.  What can you do to manage stress?  You can try these things to help manage stress:   Do something active. Exercise or activity can help reduce stress. Walking is a great way to get started. Even everyday activities such as housecleaning or yard work can help.  Try yoga or terrell chi. These techniques combine exercise and meditation. You may need some training at first to learn them.  Do something you enjoy. For example, listen to music or go to a movie. Practice your hobby or do volunteer work.  Meditate. This can help you relax, because you are not worrying about what happened before or what may happen in the future.  Do guided imagery. Imagine yourself in any setting that helps you feel calm. You can use online videos, books, or a teacher to guide you.  Do breathing exercises. For example:  From a standing position, bend forward from the waist with your knees slightly bent. Let your arms dangle close to the floor.  Breathe in slowly and deeply as you return to a standing position. Roll up slowly and lift your head last.  Hold your breath for just a few seconds in the standing position.  Breathe out slowly and bend forward from the waist.  Let your feelings out. Talk, laugh, cry, and express anger when you need to. Talking with supportive friends or family, a counselor, or a isiah leader about your feelings is a healthy way to relieve stress. Avoid discussing your feelings with people who make you feel worse.  Write. It may help to write about things that are bothering you. This helps you find out how much stress you feel and what is causing it. When you know this, you can find better ways to cope.  What can you do to prevent stress?  You might try some  "of these things to help prevent stress:  Manage your time. This helps you find time to do the things you want and need to do.  Get enough sleep. Your body recovers from the stresses of the day while you are sleeping.  Get support. Your family, friends, and community can make a difference in how you experience stress.  Limit your news feed. Avoid or limit time on social media or news that may make you feel stressed.  Do something active. Exercise or activity can help reduce stress. Walking is a great way to get started.  Where can you learn more?  Go to https://www.[employer].net/patiented  Enter N032 in the search box to learn more about \"Learning About Stress.\"  Current as of: October 24, 2023               Content Version: 14.0    8375-0782 [employer].   Care instructions adapted under license by your healthcare professional. If you have questions about a medical condition or this instruction, always ask your healthcare professional. [employer] disclaims any warranty or liability for your use of this information.      "

## 2024-05-23 LAB — VIT B12 SERPL-MCNC: 643 PG/ML (ref 232–1245)

## 2024-05-24 NOTE — RESULT ENCOUNTER NOTE
Rosa,  Labs look good.  Your vitamin B12 level was normal range and somewhat improved from a few years ago.  Your blood count panel was normal with no anemia.  The diabetes A1c test which looks at the average glucose over 3 months is normal.  Please MyChart or call if you have any concerns or questions.   Sincerely,  Emma Guerra MD

## 2024-06-12 DIAGNOSIS — M81.0 OSTEOPOROSIS, UNSPECIFIED OSTEOPOROSIS TYPE, UNSPECIFIED PATHOLOGICAL FRACTURE PRESENCE: ICD-10-CM

## 2024-06-12 RX ORDER — ALENDRONATE SODIUM 70 MG/1
TABLET ORAL
Qty: 12 TABLET | Refills: 3 | Status: SHIPPED | OUTPATIENT
Start: 2024-06-12

## 2024-06-21 ENCOUNTER — HOSPITAL ENCOUNTER (OUTPATIENT)
Dept: CT IMAGING | Facility: OTHER | Age: 69
Discharge: HOME | End: 2024-06-21
Attending: FAMILY MEDICINE | Admitting: FAMILY MEDICINE
Payer: MEDICARE

## 2024-06-21 DIAGNOSIS — E78.5 HYPERLIPIDEMIA LDL GOAL <160: ICD-10-CM

## 2024-06-21 PROCEDURE — 75571 CT HRT W/O DYE W/CA TEST: CPT | Mod: ME,GA

## 2024-06-21 NOTE — RESULT ENCOUNTER NOTE
Glenn Lee,  Excellent news!  You had  NO calcium seen in the coronary arteries.  Essentially, this means we cannot see any plaque right now in your heart blood vessels.  Good! So, I think we are fine staying off cholesterol medication.  There is no indication to start at this time.  Please let me know if you have questions  Kind regards,  Emma Guerra MD

## 2024-11-07 ENCOUNTER — HOSPITAL ENCOUNTER (OUTPATIENT)
Dept: BONE DENSITY | Facility: OTHER | Age: 69
Discharge: HOME | End: 2024-11-07
Attending: FAMILY MEDICINE
Payer: MEDICARE

## 2024-11-07 ENCOUNTER — HOSPITAL ENCOUNTER (OUTPATIENT)
Dept: MAMMOGRAPHY | Facility: OTHER | Age: 69
Discharge: HOME | End: 2024-11-07
Attending: FAMILY MEDICINE
Payer: MEDICARE

## 2024-11-07 DIAGNOSIS — E28.39 ESTROGEN DEFICIENCY: ICD-10-CM

## 2024-11-07 DIAGNOSIS — Z12.31 VISIT FOR SCREENING MAMMOGRAM: ICD-10-CM

## 2024-11-07 PROCEDURE — 77080 DXA BONE DENSITY AXIAL: CPT

## 2024-11-07 PROCEDURE — 77063 BREAST TOMOSYNTHESIS BI: CPT

## 2024-11-08 NOTE — RESULT ENCOUNTER NOTE
Glenn Lee,  I am happy to see there has been stability of your bone density.  It would be okay to go off the Fosamax (alendronate) this year.  Certainly, keep up with regular calcium and vitamin D intake along with your strength training exercises.  We will plan to continue to monitor bone density closely with next scan in 2 to 3 years.  Kind regards,  Emma Guerra MD

## 2025-07-02 ENCOUNTER — OFFICE VISIT (OUTPATIENT)
Dept: FAMILY MEDICINE | Facility: OTHER | Age: 70
End: 2025-07-02
Attending: FAMILY MEDICINE
Payer: MEDICARE

## 2025-07-02 VITALS
OXYGEN SATURATION: 98 % | RESPIRATION RATE: 16 BRPM | HEIGHT: 62 IN | WEIGHT: 100 LBS | SYSTOLIC BLOOD PRESSURE: 138 MMHG | TEMPERATURE: 99.1 F | BODY MASS INDEX: 18.4 KG/M2 | HEART RATE: 62 BPM | DIASTOLIC BLOOD PRESSURE: 84 MMHG

## 2025-07-02 DIAGNOSIS — H69.92 DYSFUNCTION OF LEFT EUSTACHIAN TUBE: Primary | ICD-10-CM

## 2025-07-02 PROCEDURE — G0463 HOSPITAL OUTPT CLINIC VISIT: HCPCS

## 2025-07-02 ASSESSMENT — PAIN SCALES - GENERAL: PAINLEVEL_OUTOF10: NO PAIN (0)

## 2025-07-02 NOTE — PATIENT INSTRUCTIONS
Zyrtec d 10 mg daily c 10 days  Flonase at bedtime x 2 weeks      Middle ear effusion/ eust tube dysfunction

## 2025-07-02 NOTE — NURSING NOTE
Patient is here for concerns with left ear. Feels pressure and pain into jaw.  Has been a couple weeks.     No LMP recorded. Patient is postmenopausal.  Medication Reconciliation: complete    Bernadine Bueno LPN 7/2/2025 2:47 PM       Advance care directive on file? no  Advance care directive provided to patient? no       Bernadine Bueno LPN
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
## 2025-07-09 ENCOUNTER — OFFICE VISIT (OUTPATIENT)
Dept: FAMILY MEDICINE | Facility: OTHER | Age: 70
End: 2025-07-09
Payer: MEDICARE

## 2025-07-09 VITALS
OXYGEN SATURATION: 97 % | RESPIRATION RATE: 16 BRPM | HEIGHT: 61 IN | SYSTOLIC BLOOD PRESSURE: 118 MMHG | HEART RATE: 75 BPM | BODY MASS INDEX: 18.43 KG/M2 | DIASTOLIC BLOOD PRESSURE: 80 MMHG | TEMPERATURE: 97.9 F | WEIGHT: 97.6 LBS

## 2025-07-09 DIAGNOSIS — H69.92 ACUTE DYSFUNCTION OF LEFT EUSTACHIAN TUBE: Primary | ICD-10-CM

## 2025-07-09 PROCEDURE — G0463 HOSPITAL OUTPT CLINIC VISIT: HCPCS

## 2025-07-09 RX ORDER — CETIRIZINE HYDROCHLORIDE, PSEUDOEPHEDRINE HYDROCHLORIDE 5; 120 MG/1; MG/1
1 TABLET, FILM COATED, EXTENDED RELEASE ORAL 2 TIMES DAILY
COMMUNITY

## 2025-07-09 RX ORDER — MOMETASONE FUROATE MONOHYDRATE 50 UG/1
2 SPRAY, METERED NASAL DAILY
COMMUNITY

## 2025-07-09 RX ORDER — PREDNISONE 20 MG/1
20 TABLET ORAL DAILY
Qty: 3 TABLET | Refills: 0 | Status: SHIPPED | OUTPATIENT
Start: 2025-07-10 | End: 2025-07-13

## 2025-07-09 ASSESSMENT — PAIN SCALES - GENERAL: PAINLEVEL_OUTOF10: NO PAIN (0)

## 2025-07-09 NOTE — PROGRESS NOTES
ASSESSMENT/PLAN:     I have reviewed the nursing notes.  I have reviewed the findings, diagnosis, plan and need for follow up with the patient.        1. Acute dysfunction of left eustachian tube (Primary)  - predniSONE (DELTASONE) 20 MG tablet; Take 1 tablet (20 mg) by mouth daily for 3 days.  Dispense: 3 tablet; Refill: 0     Symptoms have not responded to Zyrtec-D and Flonase.  Normal appearing TM with no indications of infection.  No nasal sinus symptoms.  Recommend 3 day course of Prednisone and OTC ear pain drops PRN.  May use over-the-counter Tylenol or ibuprofen PRN    Discussed warning signs/symptoms indicative of need to f/u  Follow up if symptoms persist or worsen or concerns      I explained my diagnostic considerations and recommendations to the patient, who voiced understanding and agreement with the treatment plan. All questions were answered. We discussed potential side effects of any prescribed or recommended therapies, as well as expectations for response to treatments.    Brittany Lao NP  [facility] AND [facility]      SUBJECTIVE:   Rosa Craven is a 69 year old female who presents to clinic today for the following health issues:  Follow up ear    HPI  Patient here in follow-up for left ear pain with pressure and plugged sensation.  She was seen a week ago by primary care and started on Zyrtec-D and Flonase which has not been effective.  She reports she feels very jittery while on the decongestant.  Pain is mostly at night.  Noises are echoing.  No hearing loss.  No nasal drainage/congestion.   No headaches or sinus pressure.  No sore throat.  No cough or breathing concerns.          Past Medical History:   Diagnosis Date    Anemia     NO ACTIVE PROBLEMS     Other dyschromia      Past Surgical History:   Procedure Laterality Date    COLONOSCOPY      ENDOSCOPY UPPER, COLONOSCOPY, COMBINED  12/2/2011    Procedure:COMBINED ENDOSCOPY UPPER, COLONOSCOPY; Colonoscopy with Snare  "polypectomy, Esophagogastroduodenoscopy with duodenal and gastric biopsy; Surgeon:MONTY WOOD; Location: OR     CAPSULE ENDOSCOPY  12/15/2011    Procedure:CAPSULE/PILL CAM ENDOSCOPY; Surgeon:NEEMA INMAN; Location: GI    wisdom teeth  as teen     Social History     Tobacco Use    Smoking status: Never     Passive exposure: Never    Smokeless tobacco: Never   Substance Use Topics    Alcohol use: Yes     Comment: 3 drinks per week     Current Outpatient Medications   Medication Sig Dispense Refill    Calcium Carb-Cholecalciferol (CALCIUM 600 + D PO)       cetirizine-pseudoePHEDrine ER (ZYRTEC-D) 5-120 MG 12 hr tablet Take 1 tablet by mouth 2 times daily.      loratadine (CLARITIN) 10 MG tablet Take 10 mg by mouth daily      mometasone (NASONEX) 50 MCG/ACT nasal spray Spray 2 sprays into both nostrils daily.      Multiple Vitamin (MULTIVITAMIN OR) Take 1 tablet by mouth daily.       No Known Allergies      Past medical history, past surgical history, current medications and allergies reviewed and accurate to the best of my knowledge.        OBJECTIVE:     /80 (BP Location: Left arm, Patient Position: Sitting, Cuff Size: Child)   Pulse 75   Temp 97.9  F (36.6  C) (Temporal)   Resp 16   Ht 1.549 m (5' 1\")   Wt 44.3 kg (97 lb 9.6 oz)   SpO2 97%   BMI 18.44 kg/m    Body mass index is 18.44 kg/m .      Physical Exam  General Appearance: Well appearing adult female, appropriate appearance for age. No acute distress  Ears: Left TM intact, no erythema, no effusion, no bulging, no purulence.  Right TM intact, no erythema, no effusion, no bulging, no purulence.  Left auditory canal clear without drainage or bleeding.  Right auditory canal clear without drainage or bleeding.  Normal external ears, non tender.  Nose:  No noted drainage or congestion   Psychological: normal affect, alert, oriented, and pleasant.           "

## 2025-07-09 NOTE — NURSING NOTE
"Chief Complaint   Patient presents with    Ear Problem     Pain and pressure in left ear     Patient for pain/pressure in left ear, stating it feels like water is in it. Patient saw Dr. Henderson 1 week ago and she began using OTC treatments which have been ineffective.     Initial /80 (BP Location: Left arm, Patient Position: Sitting, Cuff Size: Child)   Pulse 75   Temp 97.9  F (36.6  C) (Temporal)   Resp 16   Ht 1.549 m (5' 1\")   Wt 44.3 kg (97 lb 9.6 oz)   SpO2 97%   BMI 18.44 kg/m   Estimated body mass index is 18.44 kg/m  as calculated from the following:    Height as of this encounter: 1.549 m (5' 1\").    Weight as of this encounter: 44.3 kg (97 lb 9.6 oz).  Medication Review: complete    The next two questions are to help us understand your food security.  If you are feeling you need any assistance in this area, we have resources available to support you today.          7/9/2025   SDOH- Food Insecurity   Within the past 12 months, did you worry that your food would run out before you got money to buy more? N   Within the past 12 months, did the food you bought just not last and you didn t have money to get more? N         Health Care Directive:  Patient does not have a Health Care Directive: Discussed advance care planning with patient; however, patient declined at this time.    Katelynn Art LPN 7/9/2025 1:12 PM        "

## 2025-07-14 ENCOUNTER — MYC MEDICAL ADVICE (OUTPATIENT)
Dept: FAMILY MEDICINE | Facility: OTHER | Age: 70
End: 2025-07-14
Payer: MEDICARE

## 2025-07-14 NOTE — TELEPHONE ENCOUNTER
"Patient wondering if anything can be done for pressure/full feeling in left ear after treatment.      7/2 seen in clinic by [facility] and 7/9 seen in [facility] and given Prednisone (3 days).  Prednisone helped.  Still having pressure/full feeling in ear.  Taking daily Claritin.      \"Is there anything else you recommend? If my ears feel plugged do you think I can fly for the vacation in 1 week?\"      My Thoughts:  Make sure you're also using Flonase with your Claritin.  Yes- you can still fly- but ear pain/pressure may worsen during flight with the change of altitude/pressure.  Recommended to chew gum during flight (for sure during take off and landing).  Hopefully you'll start to notice improvement over the next week, but you can certainly be seen before you leave again if that would give you peace of mind.  There may not be a lot they can do for ear pressure/plugging besides recommending giving it more time and taking a daily antihistamine and using Flonase daily, however.      _____________________________________________________________________________    7/2 OV with [facility] for ear pressure/pain  7/9/25  [facility] for [facility]   Prednisone given. (3 days ended 7/12)   Helped eliminate pain at night.     Still has pressure/full feeling in left ear.     Less intense but still present    \"Do I wait to see if it clears on it's own or should I be seen again?\"   Going on vacation soon.      Clarifying question(s) sent to patient.      Shaneka Hernandez RN on 7/14/2025 at 9:08 AM    "

## 2025-07-14 NOTE — TELEPHONE ENCOUNTER
I think your response looks fine and I really do not have additional recommendations.     Apurva Shafer NP on 7/14/2025 at 10:23 AM

## 2025-07-15 NOTE — PROGRESS NOTES
Assessment & Plan     {Diag Picklist:940803}      Olayinka Lee is a 69 year old, presenting for the following health issues:  No chief complaint on file.    History of Present Illness       Reason for visit:  Follow up on ear pain/pressure    She eats 2-3 servings of fruits and vegetables daily.She consumes 1 sweetened beverage(s) daily.She exercises with enough effort to increase her heart rate 30 to 60 minutes per day.  She exercises with enough effort to increase her heart rate 6 days per week.   She is taking medications regularly.            PAST MEDICAL HISTORY:   Past Medical History:   Diagnosis Date    Anemia     NO ACTIVE PROBLEMS     Other dyschromia        PAST SURGICAL HISTORY:   Past Surgical History:   Procedure Laterality Date    COLONOSCOPY      ENDOSCOPY UPPER, COLONOSCOPY, COMBINED  12/2/2011    Procedure:COMBINED ENDOSCOPY UPPER, COLONOSCOPY; Colonoscopy with Snare polypectomy, Esophagogastroduodenoscopy with duodenal and gastric biopsy; Surgeon:MONTY WOOD; Location: OR    [facility] CAPSULE ENDOSCOPY  12/15/2011    Procedure:CAPSULE/PILL CAM ENDOSCOPY; Surgeon:NEEMA INMAN; Location: GI    wisdom teeth  as teen       FAMILY HISTORY:   Family History   Problem Relation Age of Onset    Cancer Mother 64        cervical CA    C.A.D. Father 46        MI (perhaps it was a PE as thought came from DVT)- smoked, overweight       SOCIAL HISTORY:   Social History     Tobacco Use    Smoking status: Never     Passive exposure: Never    Smokeless tobacco: Never   Substance Use Topics    Alcohol use: Yes     Comment: 3 drinks per week      No Known Allergies  Current Outpatient Medications   Medication Sig Dispense Refill    Calcium Carb-Cholecalciferol (CALCIUM 600 + D PO)       cetirizine-pseudoePHEDrine ER (ZYRTEC-D) 5-120 MG 12 hr tablet Take 1 tablet by mouth 2 times daily.      loratadine (CLARITIN) 10 MG tablet Take 10 mg by mouth daily      mometasone (NASONEX) 50 MCG/ACT nasal  spray Spray 2 sprays into both nostrils daily.      Multiple Vitamin (MULTIVITAMIN OR) Take 1 tablet by mouth daily.       No current facility-administered medications for this visit.           Objective    There were no vitals taken for this visit.  There is no height or weight on file to calculate BMI.  Physical Exam   General: Pleasant, in no apparent distress.  Eyes: Sclera are white and conjunctiva are clear bilaterally. Lacrimal apparatus free of erythema, edema, and discharge bilaterally.  Ears: External ears without erythema or edema. Tympanic membranes are pearly white and without erythema, scarring or perforations bilaterally. External auditory canals are free of foreign bodies, erythema, ulcers, and masses.  Nose: External nose is symmetrical and free of lesions and deformities. Mucosa is soft pink and without erythema, edema, bleeding, or exudate. No septal perforation or deviation.  Oropharynx: Oral mucosa is pink and without ulcers, nodules, and white patches. Tongue is symmetrical, pink, and without masses or lesions. Pharynx is pink, symmetrical, and without lesions. Uvula is midline. Tonsils are pink, symmetrical, and without edema, ulcers, or exudates, and 1+ bilaterally.  Neck: No cervical lymphadenopathy on inspection and palpation.  Thyroid: Thyroid isthmus is palpable and midline. Lobes are palpable bilaterally but not enlarged.  Cardiovascular: Regular rate and rhythm with S1 equal to S2. No murmurs, friction rubs, or gallops.   Respiratory: Lungs are resonant and clear to auscultation bilaterally. No wheezes, crackles, or rhonchi.  Abdomen: Abdomen is non-distended and without bulging flanks, prominent venous markings, or ecchymosis. Active bowel sounds heard in all four quadrants. No tenderness to palpation in all four quadrants. No rebound tenderness or guarding. No palpable masses noted. No hepatosplenomegaly.  Musculoskeletal: Back is straight, no tenderness to palpation of paraspinal and  paravertebral muscles. Full ROM of back, neck, upper and lower extremities.  Neurologic Exam: Non-focal, symmetric DTRs, normal gross motor, tone coordination and no visible tremor.  Skin: No jaundice, pallor, rashes, or lesions.  Psych: Appropriate mood and affect.      Signed Electronically by: Viky Ludwig PA-C

## 2025-07-17 ENCOUNTER — OFFICE VISIT (OUTPATIENT)
Dept: FAMILY MEDICINE | Facility: OTHER | Age: 70
End: 2025-07-17
Attending: PHYSICIAN ASSISTANT
Payer: MEDICARE

## 2025-07-17 VITALS
OXYGEN SATURATION: 99 % | SYSTOLIC BLOOD PRESSURE: 124 MMHG | DIASTOLIC BLOOD PRESSURE: 80 MMHG | RESPIRATION RATE: 18 BRPM | HEART RATE: 70 BPM | BODY MASS INDEX: 18.67 KG/M2 | WEIGHT: 98.8 LBS | TEMPERATURE: 98.8 F

## 2025-07-17 ASSESSMENT — PAIN SCALES - GENERAL: PAINLEVEL_OUTOF10: NO PAIN (0)

## 2025-07-17 NOTE — NURSING NOTE
"Chief Complaint   Patient presents with    Follow Up     Eustachian tube dysfunction of left ear    Ear Problem     Pressure and feels like ear is clogged       Initial /80   Pulse 70   Temp 98.8  F (37.1  C) (Tympanic)   Resp 18   Wt 44.8 kg (98 lb 12.8 oz)   SpO2 99%   BMI 18.67 kg/m   Estimated body mass index is 18.67 kg/m  as calculated from the following:    Height as of 7/9/25: 1.549 m (5' 1\").    Weight as of this encounter: 44.8 kg (98 lb 12.8 oz).  Medication Review: complete    The next two questions are to help us understand your food security.  If you are feeling you need any assistance in this area, we have resources available to support you today.          7/9/2025   SDOH- Food Insecurity   Within the past 12 months, did you worry that your food would run out before you got money to buy more? N   Within the past 12 months, did the food you bought just not last and you didn t have money to get more? N         Health Care Directive:  Patient does not have a Health Care Directive: Discussed advance care planning with patient; however, patient declined at this time.    Yanely Caballero LPN      "